# Patient Record
Sex: MALE | Race: WHITE | NOT HISPANIC OR LATINO | Employment: UNEMPLOYED | URBAN - METROPOLITAN AREA
[De-identification: names, ages, dates, MRNs, and addresses within clinical notes are randomized per-mention and may not be internally consistent; named-entity substitution may affect disease eponyms.]

---

## 2023-06-26 LAB
FERRITIN (UG/LL) IN SER/PLAS: 82 UG/L (ref 20–300)
IRON (UG/DL) IN SER/PLAS: 81 UG/DL (ref 35–150)
IRON BINDING CAPACITY (UG/DL) IN SER/PLAS: 392 UG/DL (ref 240–445)
IRON SATURATION (%) IN SER/PLAS: 21 % (ref 25–45)

## 2023-08-16 ENCOUNTER — HOSPITAL ENCOUNTER (OUTPATIENT)
Dept: DATA CONVERSION | Facility: HOSPITAL | Age: 73
Discharge: HOME | End: 2023-08-16
Payer: MEDICARE

## 2023-08-16 DIAGNOSIS — R06.2 WHEEZING: ICD-10-CM

## 2023-08-16 DIAGNOSIS — R07.89 OTHER CHEST PAIN: ICD-10-CM

## 2023-09-18 PROBLEM — G47.33 OSA (OBSTRUCTIVE SLEEP APNEA): Status: ACTIVE | Noted: 2023-09-18

## 2023-09-18 PROBLEM — I48.91 ATRIAL FIBRILLATION (MULTI): Status: ACTIVE | Noted: 2023-09-18

## 2023-09-18 PROBLEM — E78.5 HYPERLIPIDEMIA: Status: ACTIVE | Noted: 2023-09-18

## 2023-09-18 PROBLEM — R94.31 ELECTROCARDIOGRAM ABNORMAL: Status: ACTIVE | Noted: 2023-09-18

## 2023-09-18 PROBLEM — G47.30 SLEEP APNEA: Status: ACTIVE | Noted: 2023-09-18

## 2023-09-18 PROBLEM — I73.9 CLAUDICATION (CMS-HCC): Status: ACTIVE | Noted: 2023-09-18

## 2023-09-18 PROBLEM — R00.2 PALPITATIONS: Status: ACTIVE | Noted: 2023-09-18

## 2023-09-18 RX ORDER — ATORVASTATIN CALCIUM 20 MG/1
1 TABLET, FILM COATED ORAL DAILY
COMMUNITY
Start: 2022-11-08

## 2023-09-18 RX ORDER — METOPROLOL TARTRATE 25 MG/1
1 TABLET, FILM COATED ORAL 3 TIMES DAILY
COMMUNITY
Start: 2020-08-12 | End: 2024-03-08 | Stop reason: SDUPTHER

## 2023-09-18 RX ORDER — PHOSPHATIDYL SERINE 100 MG
1 CAPSULE ORAL DAILY
COMMUNITY

## 2023-09-22 ENCOUNTER — HOSPITAL ENCOUNTER (OUTPATIENT)
Dept: DATA CONVERSION | Facility: HOSPITAL | Age: 73
Discharge: HOME | End: 2023-09-22
Payer: MEDICARE

## 2023-09-22 DIAGNOSIS — I25.10 ATHEROSCLEROTIC HEART DISEASE OF NATIVE CORONARY ARTERY WITHOUT ANGINA PECTORIS: ICD-10-CM

## 2023-09-22 LAB
APPEARANCE PLAS: ABNORMAL
CHOLEST SERPL-MCNC: 123 MG/DL (ref 133–200)
CHOLEST/HDLC SERPL: 3.3 RATIO
COLOR SPUN FLD: YELLOW
FASTING STATUS PATIENT QL REPORTED: ABNORMAL
HDLC SERPL-MCNC: 37 MG/DL
LDLC SERPL CALC-MCNC: 63 MG/DL (ref 65–130)
TRIGL SERPL-MCNC: 113 MG/DL (ref 40–150)

## 2023-10-23 ENCOUNTER — PHARMACY VISIT (OUTPATIENT)
Dept: PHARMACY | Facility: CLINIC | Age: 73
End: 2023-10-23
Payer: MEDICARE

## 2023-10-23 PROCEDURE — RXMED WILLOW AMBULATORY MEDICATION CHARGE

## 2023-11-07 NOTE — PROGRESS NOTES
Assessment/Plan   Patient's low back pain with radiation to the right lower extremity is most consistent with lumbar stenosis caused by multilevel disc bulging and degenerative changes and grade 1 L5 degenerative spondylolisthesis as evident on his most recent lumbar spine MRI. He is mostly neurologically intact. Treatment will involve soft tissue and spinal manipulation as well as dry needling for the lumbar erectors and right gluteus medius. Also advised the patient to do flexion-based stretches multiple times per day including lying supine and hugging his knee or knees to his chest and also ride his exercise bike or go in the pool for exercise but only to exercise to tolerance and not push through any lower extremity pain. We can refer him for pain management or obtain updated imaging if needed pending a trial of care. Advised he may continue receiving spinal traction at an outside clinic since it has been helpful in the past. Requesting previous imaging from [facility] be sent digitally.      Visits this year: 19    Subjective   Patient reports exacerbation of right-sided stabbing localized low back pain over the past few days without specific injury.  Pain was 7 out of 10 upon waking this morning and of subsided slightly after taking an NSAID.  Has slight tingling in the lateral thighs.  Overall he has been doing much better over the past couple of months and has not had any recent sciatic type symptoms and is able to walk for longer periods for example when standing and walking or going grocery shopping.  Has been stretching somewhat on his own.  Has not been extremely active but still doing some activity such as walking.    HPI - LBP w/ radiation (R) 06/14/2023 - Patient reports chronic low back pain with radiation to the right gluteal region and leg. Notes that he has had low back pain since around 2010 or 2000. Symptoms came on insidiously. Since around 2017 or 2018 he has developed radiating pain into the right  lower extremity. Has tried a few different chiropractors for treatment. Initially went to a chiropractor that is helping him a lot however the chiropractor unfortunately passed away and that he saw a new chiropractor retired. Recently saw a different chiropractor and is getting some relief with mechanical traction therapy. Notes that he occasionally will get weakness in the right lower extremity. Also dealing with bilateral knee pain and has been advised that he needs right total knee replacement. Symptoms are worse in the lower back and lower extremity with standing walking or after being in the car for a long drive and typically alleviated by sitting in a recliner. Also dealing with atrial fibrillation. In general has been less active since the back pain is worsened over the past 5 years and also because of the atrial fibrillation. Would like to be more active and be able to do more gardening and walking as currently can only walk for a few minutes depending on how severe symptoms are before pain will flare.     Review of Systems   Constitutional:  Negative for fever.   Eyes:  Negative for visual disturbance.   Respiratory:  Negative for shortness of breath.    Cardiovascular:  Negative for chest pain.   Gastrointestinal:  Negative for diarrhea, nausea and vomiting.   Genitourinary:  Negative for difficulty urinating and dysuria.   Skin:  Negative for color change.   Neurological:  Negative for dizziness.   Psychiatric/Behavioral:  Negative for agitation.    All other systems reviewed and are negative.      Objective   Examination findings (e.g., palpation & ROM): Decreased L/S ROM with pain, hypertonic and tender lumbar erectors   BL SLR 60  Relief in knee-chest position    Segmental joint dysfunction was identified in the following areas using motion palpation and/or pain provocation assessment:  Cervical:   Thoracic: 5-8, 12  Lumbopelvic:  1,2, BL SIJ    Physical Exam    Plan   Today's treatment:  SMT to  regions of segmental dysfunction identified on exam, using age-appropriate force, and manual diversified technique.   STM to patient tolerance to hypertonic paraspinal muscles  Manual dynamic stretching of the gluteal muscles, hamstrings, upper trapezius  Patient noted reduced pain and improved mobility post-treatment with pain 3/10 NRS    Treatment Plan:   The patient and I discussed the risks and benefits of chiropractic care. Based on the patient's subjective complaints along with the examination findings, it is advised that a course of chiropractic treatment by initiated. Consent for care was given both written and orally by the patient. The patient tolerated today's treatment with little or no additional discomfort and was instructed to contact the office for questions or concerns. Will see patient once per week then every 2 weeks when symptoms become mild/manageable, further spaced apart contingent upon improvement.     This chart note was generated using dictation software, and as such, there may be typographical errors present. Abbreviations: Cervical spine (CS), Dry needling (DN), Flexion adduction internal rotation (FAIR), high velocity, low amplitude (HVLA), Lumbar spine (LS), Soft tissue manipulation (STM), spinal manipulative therapy (SMT), Straight leg raise (SLR), Thoracic spine (TS).

## 2023-11-08 ENCOUNTER — ALLIED HEALTH (OUTPATIENT)
Dept: INTEGRATIVE MEDICINE | Facility: CLINIC | Age: 73
End: 2023-11-08
Payer: MEDICARE

## 2023-11-08 DIAGNOSIS — M99.03 SOMATIC DYSFUNCTION OF LUMBAR REGION: ICD-10-CM

## 2023-11-08 DIAGNOSIS — M99.05 SOMATIC DYSFUNCTION OF PELVIS REGION: ICD-10-CM

## 2023-11-08 DIAGNOSIS — M99.02 SOMATIC DYSFUNCTION OF THORACIC REGION: ICD-10-CM

## 2023-11-08 DIAGNOSIS — M48.062 LUMBAR STENOSIS WITH NEUROGENIC CLAUDICATION: Primary | ICD-10-CM

## 2023-11-08 PROCEDURE — 98941 CHIROPRACT MANJ 3-4 REGIONS: CPT | Performed by: CHIROPRACTOR

## 2023-11-08 ASSESSMENT — ENCOUNTER SYMPTOMS
SHORTNESS OF BREATH: 0
FEVER: 0
AGITATION: 0
VOMITING: 0
NAUSEA: 0
DIFFICULTY URINATING: 0
DYSURIA: 0
DIZZINESS: 0
COLOR CHANGE: 0
DIARRHEA: 0

## 2023-11-14 ASSESSMENT — ENCOUNTER SYMPTOMS
NAUSEA: 0
SHORTNESS OF BREATH: 0
DIARRHEA: 0
VOMITING: 0
COLOR CHANGE: 0
DIFFICULTY URINATING: 0
DYSURIA: 0
FEVER: 0
AGITATION: 0
DIZZINESS: 0

## 2023-11-14 NOTE — PROGRESS NOTES
Assessment/Plan   Patient's low back pain with radiation to the right lower extremity is most consistent with lumbar stenosis caused by multilevel disc bulging and degenerative changes and grade 1 L5 degenerative spondylolisthesis as evident on his most recent lumbar spine MRI. He is mostly neurologically intact. Treatment will involve soft tissue and spinal manipulation as well as dry needling for the lumbar erectors and right gluteus medius. Also advised the patient to do flexion-based stretches multiple times per day including lying supine and hugging his knee or knees to his chest and also ride his exercise bike or go in the pool for exercise but only to exercise to tolerance and not push through any lower extremity pain. We can refer him for pain management or obtain updated imaging if needed pending a trial of care. Advised he may continue receiving spinal traction at an outside clinic since it has been helpful in the past. Requesting previous imaging from [facility] be sent digitally.      Visits this year: 21    Subjective   Patient reports overall much improvement since his last visit noting pain has been mild and intermittent and localized to the lower back.  He had 1 episode of sharp pain in the right lateral thigh however it was brief and subsided on its own.  Has been somewhat active doing yard work and mowing his lawn and also has been stretching on a semiregular basis on his own which gives him some relief from the lower back pain.    HPI - LBP w/ radiation (R) 06/14/2023 - Patient reports chronic low back pain with radiation to the right gluteal region and leg. Notes that he has had low back pain since around 2010 or 2000. Symptoms came on insidiously. Since around 2017 or 2018 he has developed radiating pain into the right lower extremity. Has tried a few different chiropractors for treatment. Initially went to a chiropractor that is helping him a lot however the chiropractor unfortunately passed away and  that he saw a new chiropractor retired. Recently saw a different chiropractor and is getting some relief with mechanical traction therapy. Notes that he occasionally will get weakness in the right lower extremity. Also dealing with bilateral knee pain and has been advised that he needs right total knee replacement. Symptoms are worse in the lower back and lower extremity with standing walking or after being in the car for a long drive and typically alleviated by sitting in a recliner. Also dealing with atrial fibrillation. In general has been less active since the back pain is worsened over the past 5 years and also because of the atrial fibrillation. Would like to be more active and be able to do more gardening and walking as currently can only walk for a few minutes depending on how severe symptoms are before pain will flare.     Review of Systems   Constitutional:  Negative for fever.   Eyes:  Negative for visual disturbance.   Respiratory:  Negative for shortness of breath.    Cardiovascular:  Negative for chest pain.   Gastrointestinal:  Negative for diarrhea, nausea and vomiting.   Genitourinary:  Negative for difficulty urinating and dysuria.   Skin:  Negative for color change.   Neurological:  Negative for dizziness.   Psychiatric/Behavioral:  Negative for agitation.    All other systems reviewed and are negative.    Objective   Examination findings (e.g., palpation & ROM): Decreased L/S ROM with pain, hypertonic and tender lumbar erectors & QL (R)  BL SLR 60  Relief in knee-chest position    Segmental joint dysfunction was identified in the following areas using motion palpation and/or pain provocation assessment:  Cervical:   Thoracic: 5-8, 12  Lumbopelvic:  1,2, BL SIJ    Physical Exam    Plan   Today's treatment:  SMT to regions of segmental dysfunction identified on exam, using age-appropriate force, and manual diversified technique.   STM to patient tolerance to hypertonic paraspinal muscles in  LS  Manual dynamic stretching of the gluteal muscles, hamstrings  Patient noted reduced pain and improved mobility post-treatment    Treatment Plan:   The patient and I discussed the risks and benefits of chiropractic care. Based on the patient's subjective complaints along with the examination findings, it is advised that a course of chiropractic treatment by initiated. Consent for care was given both written and orally by the patient. The patient tolerated today's treatment with little or no additional discomfort and was instructed to contact the office for questions or concerns. Will see patient once per week then every 2 weeks when symptoms become mild/manageable, further spaced apart contingent upon improvement.     This chart note was generated using dictation software, and as such, there may be typographical errors present. Abbreviations: Cervical spine (CS), Dry needling (DN), Flexion adduction internal rotation (FAIR), high velocity, low amplitude (HVLA), Lumbar spine (LS), Soft tissue manipulation (STM), spinal manipulative therapy (SMT), Straight leg raise (SLR), Thoracic spine (TS).

## 2023-11-15 ENCOUNTER — ALLIED HEALTH (OUTPATIENT)
Dept: INTEGRATIVE MEDICINE | Facility: CLINIC | Age: 73
End: 2023-11-15
Payer: MEDICARE

## 2023-11-15 DIAGNOSIS — M48.062 LUMBAR STENOSIS WITH NEUROGENIC CLAUDICATION: ICD-10-CM

## 2023-11-15 DIAGNOSIS — M99.02 SOMATIC DYSFUNCTION OF THORACIC REGION: Primary | ICD-10-CM

## 2023-11-15 DIAGNOSIS — M99.05 SOMATIC DYSFUNCTION OF PELVIS REGION: ICD-10-CM

## 2023-11-15 DIAGNOSIS — M99.03 SOMATIC DYSFUNCTION OF LUMBAR REGION: ICD-10-CM

## 2023-11-15 PROCEDURE — 98941 CHIROPRACT MANJ 3-4 REGIONS: CPT | Performed by: CHIROPRACTOR

## 2023-12-12 ASSESSMENT — ENCOUNTER SYMPTOMS
FEVER: 0
VOMITING: 0
AGITATION: 0
DIFFICULTY URINATING: 0
DIARRHEA: 0
COLOR CHANGE: 0
DIZZINESS: 0
DYSURIA: 0
SHORTNESS OF BREATH: 0
NAUSEA: 0

## 2023-12-12 NOTE — PROGRESS NOTES
Assessment/Plan   Patient's low back pain with radiation to the right lower extremity is most consistent with lumbar stenosis caused by multilevel disc bulging and degenerative changes and grade 1 L5 degenerative spondylolisthesis as evident on his most recent lumbar spine MRI. He is mostly neurologically intact. Treatment will involve soft tissue and spinal manipulation as well as dry needling for the lumbar erectors and right gluteus medius. Also advised the patient to do flexion-based stretches multiple times per day including lying supine and hugging his knee or knees to his chest and also ride his exercise bike or go in the pool for exercise but only to exercise to tolerance and not push through any lower extremity pain. We can refer him for pain management or obtain updated imaging if needed pending a trial of care. Advised he may continue receiving spinal traction at an outside clinic since it has been helpful in the past. Requesting previous imaging from [facility] be sent digitally.      Visits this year: 22    Subjective   Patient reports that LBP is mild/intermittent however he has not had any sciatic pain/leg pain since last visit which has been good. Has been somewhat active with yardwork. No neck pain. Felt good after last visit.    HPI - LBP w/ radiation (R) 06/14/2023 - Patient reports chronic low back pain with radiation to the right gluteal region and leg. Notes that he has had low back pain since around 2010 or 2000. Symptoms came on insidiously. Since around 2017 or 2018 he has developed radiating pain into the right lower extremity. Has tried a few different chiropractors for treatment. Initially went to a chiropractor that is helping him a lot however the chiropractor unfortunately passed away and that he saw a new chiropractor retired. Recently saw a different chiropractor and is getting some relief with mechanical traction therapy. Notes that he occasionally will get weakness in the right lower  extremity. Also dealing with bilateral knee pain and has been advised that he needs right total knee replacement. Symptoms are worse in the lower back and lower extremity with standing walking or after being in the car for a long drive and typically alleviated by sitting in a recliner. Also dealing with atrial fibrillation. In general has been less active since the back pain is worsened over the past 5 years and also because of the atrial fibrillation. Would like to be more active and be able to do more gardening and walking as currently can only walk for a few minutes depending on how severe symptoms are before pain will flare.     Review of Systems   Constitutional:  Negative for fever.   Eyes:  Negative for visual disturbance.   Respiratory:  Negative for shortness of breath.    Cardiovascular:  Negative for chest pain.   Gastrointestinal:  Negative for diarrhea, nausea and vomiting.   Genitourinary:  Negative for difficulty urinating and dysuria.   Skin:  Negative for color change.   Neurological:  Negative for dizziness.   Psychiatric/Behavioral:  Negative for agitation.    All other systems reviewed and are negative.    Objective   Examination findings (e.g., palpation & ROM): Decreased L/S ROM with pain, hypertonic and tender lumbar erectors & QL (L>R)  BL SLR 60, tightness on R  Relief in knee-chest position    Segmental joint dysfunction was identified in the following areas using motion palpation and/or pain provocation assessment:  Cervical:   Thoracic: 5-8, 12  Lumbopelvic:  1,2, BL SIJ    Physical Exam    Plan   Today's treatment:  SMT to regions of segmental dysfunction identified on exam, using age-appropriate force, and manual diversified technique.   STM to patient tolerance to hypertonic paraspinal muscles in LS  Manual dynamic stretching of the gluteal muscles, hamstrings 5m  Patient noted reduced pain and improved mobility post-treatment    Treatment Plan:   The patient and I discussed the risks  and benefits of chiropractic care. Based on the patient's subjective complaints along with the examination findings, it is advised that a course of chiropractic treatment by initiated. Consent for care was given both written and orally by the patient. The patient tolerated today's treatment with little or no additional discomfort and was instructed to contact the office for questions or concerns. Will see patient once per week then every 2 weeks when symptoms become mild/manageable, further spaced apart contingent upon improvement.     This chart note was generated using dictation software, and as such, there may be typographical errors present. Abbreviations: Cervical spine (CS), Dry needling (DN), Flexion adduction internal rotation (FAIR), high velocity, low amplitude (HVLA), Lumbar spine (LS), Soft tissue manipulation (STM), spinal manipulative therapy (SMT), Straight leg raise (SLR), Thoracic spine (TS).

## 2023-12-13 ENCOUNTER — ALLIED HEALTH (OUTPATIENT)
Dept: INTEGRATIVE MEDICINE | Facility: CLINIC | Age: 73
End: 2023-12-13
Payer: MEDICARE

## 2023-12-13 DIAGNOSIS — M99.05 SOMATIC DYSFUNCTION OF PELVIS REGION: ICD-10-CM

## 2023-12-13 DIAGNOSIS — M99.03 SOMATIC DYSFUNCTION OF LUMBAR REGION: ICD-10-CM

## 2023-12-13 DIAGNOSIS — M48.062 LUMBAR STENOSIS WITH NEUROGENIC CLAUDICATION: ICD-10-CM

## 2023-12-13 DIAGNOSIS — M99.02 SOMATIC DYSFUNCTION OF THORACIC REGION: Primary | ICD-10-CM

## 2023-12-13 PROCEDURE — 98941 CHIROPRACT MANJ 3-4 REGIONS: CPT | Performed by: CHIROPRACTOR

## 2023-12-18 ENCOUNTER — OFFICE VISIT (OUTPATIENT)
Dept: SLEEP MEDICINE | Facility: CLINIC | Age: 73
End: 2023-12-18
Payer: MEDICARE

## 2023-12-18 VITALS
WEIGHT: 200 LBS | HEIGHT: 71 IN | DIASTOLIC BLOOD PRESSURE: 70 MMHG | BODY MASS INDEX: 28 KG/M2 | SYSTOLIC BLOOD PRESSURE: 134 MMHG | OXYGEN SATURATION: 98 % | HEART RATE: 67 BPM

## 2023-12-18 DIAGNOSIS — G47.33 OBSTRUCTIVE SLEEP APNEA (ADULT) (PEDIATRIC): Primary | ICD-10-CM

## 2023-12-18 PROCEDURE — 1160F RVW MEDS BY RX/DR IN RCRD: CPT | Performed by: INTERNAL MEDICINE

## 2023-12-18 PROCEDURE — 1159F MED LIST DOCD IN RCRD: CPT | Performed by: INTERNAL MEDICINE

## 2023-12-18 PROCEDURE — 1036F TOBACCO NON-USER: CPT | Performed by: INTERNAL MEDICINE

## 2023-12-18 PROCEDURE — 1126F AMNT PAIN NOTED NONE PRSNT: CPT | Performed by: INTERNAL MEDICINE

## 2023-12-18 PROCEDURE — 99213 OFFICE O/P EST LOW 20 MIN: CPT | Performed by: INTERNAL MEDICINE

## 2023-12-18 ASSESSMENT — SLEEP AND FATIGUE QUESTIONNAIRES
HOW LIKELY ARE YOU TO NOD OFF OR FALL ASLEEP WHILE SITTING AND TALKING TO SOMEONE: WOULD NEVER DOZE
SITING INACTIVE IN A PUBLIC PLACE LIKE A CLASS ROOM OR A MOVIE THEATER: WOULD NEVER DOZE
ESS-CHAD TOTAL SCORE: 4
HOW LIKELY ARE YOU TO NOD OFF OR FALL ASLEEP IN A CAR, WHILE STOPPED FOR A FEW MINUTES IN TRAFFIC: WOULD NEVER DOZE
HOW LIKELY ARE YOU TO NOD OFF OR FALL ASLEEP WHILE WATCHING TV: MODERATE CHANCE OF DOZING
HOW LIKELY ARE YOU TO NOD OFF OR FALL ASLEEP WHILE SITTING AND READING: WOULD NEVER DOZE
HOW LIKELY ARE YOU TO NOD OFF OR FALL ASLEEP WHEN YOU ARE A PASSENGER IN A CAR FOR AN HOUR WITHOUT A BREAK: WOULD NEVER DOZE
HOW LIKELY ARE YOU TO NOD OFF OR FALL ASLEEP WHILE LYING DOWN TO REST IN THE AFTERNOON WHEN CIRCUMSTANCES PERMIT: MODERATE CHANCE OF DOZING
HOW LIKELY ARE YOU TO NOD OFF OR FALL ASLEEP WHILE SITTING QUIETLY AFTER LUNCH WITHOUT ALCOHOL: WOULD NEVER DOZE

## 2023-12-18 ASSESSMENT — PAIN SCALES - GENERAL: PAINLEVEL: 0-NO PAIN

## 2023-12-18 NOTE — ASSESSMENT & PLAN NOTE
- Artie Grove  has sleep apnea and requires treatment.  - Artie Grove demonstrates good compliance and benefit from PAP therapy  - Continue Auto PAP cmH20 through [facility]  - Order for renewal of PAP supplies placed   - Discussed recall and info on the Dreamstation 2

## 2023-12-18 NOTE — PROGRESS NOTES
"    Subjective   Patient ID: Artie Grove is a 73 y.o. male who presents for Pap Adherence Followup and Sleep Apnea.  HPI    Prior Sleep History:  05/15/2023; office Visit: Dr. Phillip Henson: ARTIE is here to follow-up on the management of his obstructive sleep apnea which is currently being managed with positive airway pressure therapy.   A downloaded compliance report was reviewed and was interpreted by myself as follows: > 4 hour compliance was 100%, with an average use of 7 hours and 21 minutes, with a residual AHI 7.3 with periodic breathing 7.2%.   ARTIE reports good benefit from his device.   His afib is worse and he is going to follow-up with EP.   06/26/2023: Office Visit: Dr. Phillip Henson: ARTIE is here to discuss the results of his sleep study. ARTIE had a Titration study 6/5/23, CPAP 9 cmH2O, PLM-I 91.6  09/25/2023 : Office Visit: Dr. Phillip Henson: ARTIE is here to follow-up on the management of his obstructive sleep apnea which is currently being managed with positive airway pressure therapy.   A downloaded compliance report was reviewed and was interpreted by myself as follows: > 4 hour compliance was 100%, with an average use of 7 hours and 30 minutes, with a residual AHI 6.1.   PROBABLE RESTLESS LEG SYNDROME: This occurs frequently.  - Last ferritin 82, %iron saturation 21% on the lower side  He uses a dreamwear nasal mask.   Change to Auto- PAP 9-15 cmH20 through [facility]     Current Sleep History:      A downloaded compliance report was reviewed and was interpreted by myself as follows:  > 4 hour compliance was 100%, with an average use of 7 hours and 23 minutes, with a residual AHI 5.8 on AutoPap 9 to 15 cm H2O.     Artie Grove reports good benefit from his device.  Responded well to change in pressure    ESS: 4     Review of Systems  Review of systems negative except as per HPI  Objective   /70   Pulse 67   Ht 1.803 m (5' 11\")   Wt 90.7 kg (200 lb)   SpO2 " 98%   BMI 27.89 kg/m²    Physical Exam  PHYSICAL EXAM: GENERAL: alert pleasant and cooperative no acute distress  PSYCH EXAM: alert,oriented, in NAD with a full range of affect, normal behavior and no psychotic features    Lab Results   Component Value Date    IRON 81 06/26/2023    TIBC 392 06/26/2023    FERRITIN 82 06/26/2023        Assessment/Plan   Problem List Items Addressed This Visit             ICD-10-CM    Obstructive sleep apnea (adult) (pediatric) - Primary G47.33     - Artie Grove  has sleep apnea and requires treatment.  - Artie Grove demonstrates good compliance and benefit from PAP therapy  - Continue Auto PAP cmH20 through [facility]  - Order for renewal of PAP supplies placed   - Discussed recall and info on the Dreamstation 2

## 2023-12-18 NOTE — PATIENT INSTRUCTIONS
Carefully Monitor [employer] DreamStation 2 CPAP Machines for Signs of Overheating: FDA Safety Communication     Share   Post  Linkedin  Email  Print     Date Issued: November 28, 2023    The U.S. Food and Drug Administration (FDA) is warning patients and health care providers to carefully monitor [employer] DreamStation 2 continuous positive airway pressure (CPAP) machines for signs of overheating.    The FDA recently received medical device reports (MDRs) associated with thermal issues such as fire, smoke, burns, and other signs of overheating while people are using [employer] DreamStation 2 CPAP machines. The agency observed a recent increase in reports about these thermal issues with DreamStation 2 CPAP machines. Between August 1, 2023, and November 15, 2023, the FDA received more than 270 reports of problems associated with the device, compared with fewer than 30 MDRs received in the previous three years.    Consumers should be aware some DreamStation 2 CPAP machines were distributed as replacements for recalled DreamStation 1 CPAP machines. Based on the currently available evidence, the agency does not believe the safety issue with the DreamStation 2 is related to the foam used in the machine. This is a developing situation, and to date, reports gathered and analyzed by the FDA indicate that the thermal issues reported for the DreamStation 2 CPAP machines may be related to an electrical or a mechanical malfunction of the machine, which may cause it to overheat in certain situations.    Recommendations for Patients, Caregivers, and Health Care Providers  Follow the ’s instructions in the user manualExternal Link Disclaimer, including:  place the CPAP machine on a firm, flat surface  keep the CPAP away from carpet, fabric, or other flammable materials  carefully clean the CPAP machine  empty the CPAP machine’s water reservoir  let the CPAP machine’s heater plate and water tank cool for approximately 15  minutes before removing the tank to reduce the risk of burns. You could be burned if you touch the heater plate, come in contact with the heated water, or touch the humidifier water tank pan.  Inspect and examine the CPAP machine before and after each use for unusual smells or changes in its appearance. Some problems may only be noticeable when the machine is running, so pay attention to any differences in the CPAP machine as you prepare for bed.  Unplug the CPAP machine and do not use it if:  you smell burning, smoke, or any unusual odors,  there is a change in the appearance of the CPAP machine,  there are unexplained changes to the CPAP machine’s performance,  water is spilled into the CPAP machine,  you hear unusual sounds coming from the CPAP machine.  If you are unable to use your CPAP device due to these issues, discuss your individual health situation with a health care provider, such as your primary care physician or sleep doctor.    At this time, if you are not experiencing these issues, then the FDA does not recommend discontinuing use of these machines.  Report any concerns about the CPAP machine to the FDA, including unusual smells, sounds, or changes in appearance, and report any concerns to [employer] Link Disclaimer.  Device Description  The [employer] DreamStation 2 CPAP machine delivers positive airway pressure for treatment of obstructive sleep apnea. It is used at home and in clinical settings.    FDA Actions  The FDA is working with the company to better understand the issue(s) related to the DreamStation 2 CPAP machine and the possible underlying cause(s) and will update the public with new information, as appropriate.     The FDA is also in ongoing discussions with the company about mitigation strategies for this safety issue and will update the public accordingly.    Reporting Problems with Your Device  If you think there may be a problem with your CPAP machine, the FDA encourages you to  report the problem through the [employer] Voluntary Reporting Form.    Health care personnel employed by facilities that are subject to the FDA's user facility reporting requirements should follow the reporting procedures established by their facilities.    Questions?  If you have questions, email the Division of Industry and Consumer Education (DICE) at [email or web address] or call [phone] or [phone].    Additional Resources  FDA Press Release

## 2023-12-27 ENCOUNTER — ALLIED HEALTH (OUTPATIENT)
Dept: INTEGRATIVE MEDICINE | Facility: CLINIC | Age: 73
End: 2023-12-27
Payer: MEDICARE

## 2023-12-27 DIAGNOSIS — M99.02 SOMATIC DYSFUNCTION OF THORACIC REGION: Primary | ICD-10-CM

## 2023-12-27 DIAGNOSIS — M48.062 LUMBAR STENOSIS WITH NEUROGENIC CLAUDICATION: ICD-10-CM

## 2023-12-27 DIAGNOSIS — M99.05 SOMATIC DYSFUNCTION OF PELVIS REGION: ICD-10-CM

## 2023-12-27 DIAGNOSIS — M99.03 SOMATIC DYSFUNCTION OF LUMBAR REGION: ICD-10-CM

## 2023-12-27 PROCEDURE — 98941 CHIROPRACT MANJ 3-4 REGIONS: CPT | Performed by: CHIROPRACTOR

## 2023-12-27 ASSESSMENT — ENCOUNTER SYMPTOMS
VOMITING: 0
AGITATION: 0
DIFFICULTY URINATING: 0
DIARRHEA: 0
FEVER: 0
DIZZINESS: 0
DYSURIA: 0
COLOR CHANGE: 0
NAUSEA: 0
SHORTNESS OF BREATH: 0

## 2023-12-27 NOTE — PROGRESS NOTES
Assessment/Plan   Patient's low back pain with radiation to the right lower extremity is most consistent with lumbar stenosis caused by multilevel disc bulging and degenerative changes and grade 1 L5 degenerative spondylolisthesis as evident on his most recent lumbar spine MRI. He is mostly neurologically intact. Treatment will involve soft tissue and spinal manipulation as well as dry needling for the lumbar erectors and right gluteus medius. Also advised the patient to do flexion-based stretches multiple times per day including lying supine and hugging his knee or knees to his chest and also ride his exercise bike or go in the pool for exercise but only to exercise to tolerance and not push through any lower extremity pain. We can refer him for pain management or obtain updated imaging if needed pending a trial of care. Advised he may continue receiving spinal traction at an outside clinic since it has been helpful in the past. Requesting previous imaging from [facility] be sent digitally.      Visits this year: 23    Subjective   Patient reports that he has had localized left-sided low back pain which has been intermittent and moderate in intensity as well as tightness extending up into the upper back and bilateral periscapular region.  Has not been as active as he would like.  Has noticed that sometimes his heart rate will go to high.  Does have a cardiologist that he can follow-up with but he tracks his heart rate regularly and is careful because of his A-fib.  Is interested in increasing his exercise level but wants to take it slowly and has been guided to do so in the past considering his heart condition.  Has been careful with his dietary habits    HPI - LBP w/ radiation (R) 06/14/2023 - Patient reports chronic low back pain with radiation to the right gluteal region and leg. Notes that he has had low back pain since around 2010 or 2000. Symptoms came on insidiously. Since around 2017 or 2018 he has developed  radiating pain into the right lower extremity. Has tried a few different chiropractors for treatment. Initially went to a chiropractor that is helping him a lot however the chiropractor unfortunately passed away and that he saw a new chiropractor retired. Recently saw a different chiropractor and is getting some relief with mechanical traction therapy. Notes that he occasionally will get weakness in the right lower extremity. Also dealing with bilateral knee pain and has been advised that he needs right total knee replacement. Symptoms are worse in the lower back and lower extremity with standing walking or after being in the car for a long drive and typically alleviated by sitting in a recliner. Also dealing with atrial fibrillation. In general has been less active since the back pain is worsened over the past 5 years and also because of the atrial fibrillation. Would like to be more active and be able to do more gardening and walking as currently can only walk for a few minutes depending on how severe symptoms are before pain will flare.     Review of Systems   Constitutional:  Negative for fever.   Eyes:  Negative for visual disturbance.   Respiratory:  Negative for shortness of breath.    Cardiovascular:  Negative for chest pain.   Gastrointestinal:  Negative for diarrhea, nausea and vomiting.   Genitourinary:  Negative for difficulty urinating and dysuria.   Skin:  Negative for color change.   Neurological:  Negative for dizziness.   Psychiatric/Behavioral:  Negative for agitation.    All other systems reviewed and are negative.    Objective   Examination findings (e.g., palpation & ROM): Decreased L/S ROM with pain, hypertonic and tender lumbar erectors & QL (L>R)  BL SLR 65, tightness on R  Relief in knee-chest position    Segmental joint dysfunction was identified in the following areas using motion palpation and/or pain provocation assessment:  Cervical:   Thoracic: 5-8, 12  Lumbopelvic:  1,2, BL  SIJ    Physical Exam    Plan   Today's treatment:  SMT to regions of segmental dysfunction identified on exam, using age-appropriate force, and manual diversified technique.   STM to patient tolerance to hypertonic paraspinal muscles in LS  Manual dynamic stretching of the gluteal muscles, hamstrings, periscapular mm 5m  Patient noted reduced pain and improved mobility post-treatment    Treatment Plan:   The patient and I discussed the risks and benefits of chiropractic care. Based on the patient's subjective complaints along with the examination findings, it is advised that a course of chiropractic treatment by initiated. Consent for care was given both written and orally by the patient. The patient tolerated today's treatment with little or no additional discomfort and was instructed to contact the office for questions or concerns. Will see patient once per week then every 2 weeks when symptoms become mild/manageable, further spaced apart contingent upon improvement.     This chart note was generated using dictation software, and as such, there may be typographical errors present. Abbreviations: Cervical spine (CS), Dry needling (DN), Flexion adduction internal rotation (FAIR), high velocity, low amplitude (HVLA), Lumbar spine (LS), Soft tissue manipulation (STM), spinal manipulative therapy (SMT), Straight leg raise (SLR), Thoracic spine (TS).

## 2024-01-03 ENCOUNTER — APPOINTMENT (OUTPATIENT)
Dept: INTEGRATIVE MEDICINE | Facility: CLINIC | Age: 74
End: 2024-01-03
Payer: MEDICARE

## 2024-01-16 ASSESSMENT — ENCOUNTER SYMPTOMS
DIFFICULTY URINATING: 0
DIZZINESS: 0
DIARRHEA: 0
FEVER: 0
SHORTNESS OF BREATH: 0
COLOR CHANGE: 0
AGITATION: 0
DYSURIA: 0
VOMITING: 0
NAUSEA: 0

## 2024-01-16 NOTE — PROGRESS NOTES
Assessment/Plan   Patient's low back pain with radiation to the right lower extremity is most consistent with lumbar stenosis caused by multilevel disc bulging and degenerative changes and grade 1 L5 degenerative spondylolisthesis as evident on his most recent lumbar spine MRI. He is mostly neurologically intact. Treatment will involve soft tissue and spinal manipulation as well as dry needling for the lumbar erectors and right gluteus medius. Also advised the patient to do flexion-based stretches multiple times per day including lying supine and hugging his knee or knees to his chest and also ride his exercise bike or go in the pool for exercise but only to exercise to tolerance and not push through any lower extremity pain. We can refer him for pain management or obtain updated imaging if needed pending a trial of care. Advised he may continue receiving spinal traction at an outside clinic since it has been helpful in the past. Requesting previous imaging from [facility] be sent digitally.      Visits this year: 1    Subjective   Patient reports that his lower back has been slightly aggravated over the past few days he is not exactly sure why.  Was carrying some boxes but was not anything extremely physical.  Thinks that maybe he was doing more activity and exercise because he was feeling better and this in turn side and back.  Otherwise no specific injury or trauma.  Some radiating pain into the right anterior thigh which she describes as a burning type sensation which is intermittent.  Pain is described as mild to moderate in intensity and frequent.  Symptoms are alleviated by sitting. Also notes mild neck stiffness/tightness.    HPI - LBP w/ radiation (R) 06/14/2023 - Patient reports chronic low back pain with radiation to the right gluteal region and leg. Notes that he has had low back pain since around 2010 or 2000. Symptoms came on insidiously. Since around 2017 or 2018 he has developed radiating pain into the  right lower extremity. Has tried a few different chiropractors for treatment. Initially went to a chiropractor that is helping him a lot however the chiropractor unfortunately passed away and that he saw a new chiropractor retired. Recently saw a different chiropractor and is getting some relief with mechanical traction therapy. Notes that he occasionally will get weakness in the right lower extremity. Also dealing with bilateral knee pain and has been advised that he needs right total knee replacement. Symptoms are worse in the lower back and lower extremity with standing walking or after being in the car for a long drive and typically alleviated by sitting in a recliner. Also dealing with atrial fibrillation. In general has been less active since the back pain is worsened over the past 5 years and also because of the atrial fibrillation. Would like to be more active and be able to do more gardening and walking as currently can only walk for a few minutes depending on how severe symptoms are before pain will flare.     Review of Systems   Constitutional:  Negative for fever.   Eyes:  Negative for visual disturbance.   Respiratory:  Negative for shortness of breath.    Cardiovascular:  Negative for chest pain.   Gastrointestinal:  Negative for diarrhea, nausea and vomiting.   Genitourinary:  Negative for difficulty urinating and dysuria.   Skin:  Negative for color change.   Neurological:  Negative for dizziness.   Psychiatric/Behavioral:  Negative for agitation.    All other systems reviewed and are negative.    Objective   Examination findings (e.g., palpation & ROM): Decreased L/S ROM with pain, hypertonic and tender lumbar erectors & QL (L>R)  BL SLR 65, tightness on R  Relief in knee-chest position    Segmental joint dysfunction was identified in the following areas using motion palpation and/or pain provocation assessment:  Cervical: 7  Thoracic: 5-8, 12  Lumbopelvic:  1,2, BL SIJ    Physical Exam    Plan    Today's treatment:  SMT to regions of segmental dysfunction identified on exam, using age-appropriate force, and manual diversified technique. Mobilization used on CS  STM to patient tolerance to hypertonic paraspinal muscles in LS  Manual dynamic stretching of the gluteal muscles, hamstrings, periscapular mm 5m  Patient noted reduced pain and improved mobility post-treatment    Treatment Plan:   The patient and I discussed the risks and benefits of chiropractic care. Based on the patient's subjective complaints along with the examination findings, it is advised that a course of chiropractic treatment by initiated. Consent for care was given both written and orally by the patient. The patient tolerated today's treatment with little or no additional discomfort and was instructed to contact the office for questions or concerns. Will see patient once per week then every 2 weeks when symptoms become mild/manageable, further spaced apart contingent upon improvement.     This chart note was generated using dictation software, and as such, there may be typographical errors present. Abbreviations: Cervical spine (CS), Dry needling (DN), Flexion adduction internal rotation (FAIR), high velocity, low amplitude (HVLA), Lumbar spine (LS), Soft tissue manipulation (STM), spinal manipulative therapy (SMT), Straight leg raise (SLR), Thoracic spine (TS).

## 2024-01-17 ENCOUNTER — ALLIED HEALTH (OUTPATIENT)
Dept: INTEGRATIVE MEDICINE | Facility: CLINIC | Age: 74
End: 2024-01-17
Payer: MEDICARE

## 2024-01-17 ENCOUNTER — APPOINTMENT (OUTPATIENT)
Dept: INTEGRATIVE MEDICINE | Facility: CLINIC | Age: 74
End: 2024-01-17
Payer: MEDICARE

## 2024-01-17 DIAGNOSIS — M99.05 SOMATIC DYSFUNCTION OF PELVIS REGION: ICD-10-CM

## 2024-01-17 DIAGNOSIS — M99.03 SOMATIC DYSFUNCTION OF LUMBAR REGION: ICD-10-CM

## 2024-01-17 DIAGNOSIS — M48.062 LUMBAR STENOSIS WITH NEUROGENIC CLAUDICATION: ICD-10-CM

## 2024-01-17 DIAGNOSIS — M99.02 SOMATIC DYSFUNCTION OF THORACIC REGION: Primary | ICD-10-CM

## 2024-01-17 PROCEDURE — 98941 CHIROPRACT MANJ 3-4 REGIONS: CPT | Performed by: CHIROPRACTOR

## 2024-01-22 ENCOUNTER — PHARMACY VISIT (OUTPATIENT)
Dept: PHARMACY | Facility: CLINIC | Age: 74
End: 2024-01-22
Payer: COMMERCIAL

## 2024-01-22 PROCEDURE — RXMED WILLOW AMBULATORY MEDICATION CHARGE

## 2024-01-31 ENCOUNTER — OFFICE VISIT (OUTPATIENT)
Dept: CARDIOLOGY | Facility: CLINIC | Age: 74
End: 2024-01-31
Payer: MEDICARE

## 2024-01-31 VITALS
SYSTOLIC BLOOD PRESSURE: 130 MMHG | WEIGHT: 207 LBS | OXYGEN SATURATION: 97 % | BODY MASS INDEX: 28.87 KG/M2 | HEART RATE: 78 BPM | DIASTOLIC BLOOD PRESSURE: 68 MMHG

## 2024-01-31 DIAGNOSIS — I25.10 ASHD (ARTERIOSCLEROTIC HEART DISEASE): ICD-10-CM

## 2024-01-31 DIAGNOSIS — R00.2 PALPITATIONS: ICD-10-CM

## 2024-01-31 DIAGNOSIS — I48.91 ATRIAL FIBRILLATION, UNSPECIFIED TYPE (MULTI): ICD-10-CM

## 2024-01-31 DIAGNOSIS — I48.0 PAROXYSMAL ATRIAL FIBRILLATION (MULTI): Primary | ICD-10-CM

## 2024-01-31 PROCEDURE — 93010 ELECTROCARDIOGRAM REPORT: CPT | Performed by: INTERNAL MEDICINE

## 2024-01-31 PROCEDURE — 99214 OFFICE O/P EST MOD 30 MIN: CPT | Mod: 25 | Performed by: INTERNAL MEDICINE

## 2024-01-31 PROCEDURE — 1126F AMNT PAIN NOTED NONE PRSNT: CPT | Performed by: INTERNAL MEDICINE

## 2024-01-31 PROCEDURE — 1159F MED LIST DOCD IN RCRD: CPT | Performed by: INTERNAL MEDICINE

## 2024-01-31 PROCEDURE — 1036F TOBACCO NON-USER: CPT | Performed by: INTERNAL MEDICINE

## 2024-01-31 PROCEDURE — 93005 ELECTROCARDIOGRAM TRACING: CPT | Performed by: INTERNAL MEDICINE

## 2024-01-31 PROCEDURE — 99214 OFFICE O/P EST MOD 30 MIN: CPT | Performed by: INTERNAL MEDICINE

## 2024-01-31 ASSESSMENT — ENCOUNTER SYMPTOMS
DYSPNEA ON EXERTION: 0
SYNCOPE: 0
ORTHOPNEA: 0
DIAPHORESIS: 0
WEAKNESS: 0
WEIGHT LOSS: 0
SHORTNESS OF BREATH: 0
CLAUDICATION: 0
PALPITATIONS: 1
NEAR-SYNCOPE: 0
MYALGIAS: 0
WHEEZING: 0
COUGH: 0
FEVER: 0
PND: 0
WEIGHT GAIN: 0
DIZZINESS: 0
IRREGULAR HEARTBEAT: 0

## 2024-01-31 ASSESSMENT — PAIN SCALES - GENERAL: PAINLEVEL: 0-NO PAIN

## 2024-01-31 NOTE — PROGRESS NOTES
Subjective      Chief Complaint   Patient presents with    Possible Afib        73-year-old male with a history of paroxysmal atrial fibrillation on as needed rate control. He was not on oral anticoagulation given his [id number] of 1. He was seen in February, I had recommended again statin therapy due to an intermediate ASCVD risk he declined. He was seen in the interim by Dr Schwarz, referred to him by his PCP for palpitations. The had discussed AADs vs catheter based therapy. He had a CACS that came back >500. He had a lexiscan SPECT ST that was normal or low risk. We started Eliquis for stroke risk reduction. I saw him last in September, he was doing well.     Last night he started to notice a rapid irregular heartbeat. He has no chest pain or dyspnea. He monitors his pulse ox at home which is ok. His HR is in the 80s at times, is 150 on ECG.       Review of Systems   Constitutional: Negative for diaphoresis, fever, weight gain and weight loss.   Eyes:  Negative for visual disturbance.   Cardiovascular:  Positive for palpitations. Negative for chest pain, claudication, dyspnea on exertion, irregular heartbeat, leg swelling, near-syncope, orthopnea, paroxysmal nocturnal dyspnea and syncope.   Respiratory:  Negative for cough, shortness of breath and wheezing.    Musculoskeletal:  Negative for muscle weakness and myalgias.   Neurological:  Negative for dizziness and weakness.   All other systems reviewed and are negative.       Past Medical History:   Diagnosis Date    Atrial fibrillation (CMS/HCC)     Personal history of other diseases of the musculoskeletal system and connective tissue     History of low back pain        Past Surgical History:   Procedure Laterality Date    TONSILLECTOMY  08/12/2014    Tonsillectomy        Social History     Socioeconomic History    Marital status: Unknown     Spouse name: Not on file    Number of children: Not on file    Years of education: Not on file    Highest education  level: Not on file   Occupational History    Not on file   Tobacco Use    Smoking status: Never    Smokeless tobacco: Never   Substance and Sexual Activity    Alcohol use: Never    Drug use: Never    Sexual activity: Not on file   Other Topics Concern    Not on file   Social History Narrative    ** Merged History Encounter **          Social Determinants of Health     Financial Resource Strain: Not on file   Food Insecurity: Not on file   Transportation Needs: Not on file   Physical Activity: Not on file   Stress: Not on file   Social Connections: Not on file   Intimate Partner Violence: Not on file   Housing Stability: Not on file        Family History   Problem Relation Name Age of Onset    Heart disease Mother      Cancer Mother      Heart disease Father          OBJECTIVE:    Vitals:    01/31/24 1502   BP: 130/68   Pulse: 78   SpO2: 97%        Physical Exam     Lab Review:   Lab Results   Component Value Date     06/16/2021    K 4.6 06/16/2021     06/16/2021    CO2 20 (L) 06/16/2021    BUN 22 06/16/2021    CREATININE 1.1 06/16/2021    GLUCOSE 106 (H) 06/16/2021    CALCIUM 9.4 06/16/2021     Lab Results   Component Value Date    CHOL 123 (L) 09/22/2023    TRIG 113 09/22/2023    HDL 37 (L) 09/22/2023       Lab Results   Component Value Date    LDLCALC 63 (L) 09/22/2023        No problem-specific Assessment & Plan notes found for this encounter.

## 2024-01-31 NOTE — ASSESSMENT & PLAN NOTE
Presumed based on CACS>500. Lexiscan ST 8/2023 was normal or low risk. Continue atorvastatin. Goal LDL <70

## 2024-01-31 NOTE — ASSESSMENT & PLAN NOTE
With rvr. Will have him increase Metoprolol to 1 tab TID for now. Will check an echocardiogram. Continue Eliquis. Refer back to Dr Schwarz to discuss AAD vs catheter based options

## 2024-02-05 ENCOUNTER — HOSPITAL ENCOUNTER (OUTPATIENT)
Dept: CARDIOLOGY | Facility: HOSPITAL | Age: 74
Discharge: HOME | End: 2024-02-05
Payer: MEDICARE

## 2024-02-05 DIAGNOSIS — I48.0 PAROXYSMAL ATRIAL FIBRILLATION (MULTI): ICD-10-CM

## 2024-02-05 LAB
AORTIC VALVE MEAN GRADIENT: 2 MMHG
AORTIC VALVE PEAK VELOCITY: 1.08 M/S
AV PEAK GRADIENT: 4.7 MMHG
AVA (PEAK VEL): 2.84 CM2
AVA (VTI): 3.06 CM2
EJECTION FRACTION APICAL 4 CHAMBER: 58.8
EJECTION FRACTION: 61 %
LEFT ATRIUM VOLUME AREA LENGTH INDEX BSA: 23.9 ML/M2
LEFT VENTRICLE INTERNAL DIMENSION DIASTOLE: 4.41 CM (ref 3.5–6)
LEFT VENTRICULAR OUTFLOW TRACT DIAMETER: 1.9 CM
MITRAL VALVE E/A RATIO: 0.91
MITRAL VALVE E/E' RATIO: 9.46
RIGHT VENTRICLE FREE WALL PEAK S': 15.8 CM/S
RIGHT VENTRICLE PEAK SYSTOLIC PRESSURE: 22.7 MMHG
TRICUSPID ANNULAR PLANE SYSTOLIC EXCURSION: 2.8 CM

## 2024-02-05 PROCEDURE — 93306 TTE W/DOPPLER COMPLETE: CPT | Performed by: INTERNAL MEDICINE

## 2024-02-05 PROCEDURE — 93306 TTE W/DOPPLER COMPLETE: CPT

## 2024-03-07 ENCOUNTER — OFFICE VISIT (OUTPATIENT)
Dept: CARDIOLOGY | Facility: CLINIC | Age: 74
End: 2024-03-07
Payer: MEDICARE

## 2024-03-07 ENCOUNTER — TELEPHONE (OUTPATIENT)
Dept: CARDIOLOGY | Facility: CLINIC | Age: 74
End: 2024-03-07

## 2024-03-07 VITALS — BODY MASS INDEX: 28.73 KG/M2 | DIASTOLIC BLOOD PRESSURE: 70 MMHG | SYSTOLIC BLOOD PRESSURE: 132 MMHG | WEIGHT: 206 LBS

## 2024-03-07 DIAGNOSIS — I48.0 PAROXYSMAL ATRIAL FIBRILLATION (MULTI): Primary | ICD-10-CM

## 2024-03-07 PROCEDURE — 93005 ELECTROCARDIOGRAM TRACING: CPT | Performed by: INTERNAL MEDICINE

## 2024-03-07 PROCEDURE — 1159F MED LIST DOCD IN RCRD: CPT | Performed by: INTERNAL MEDICINE

## 2024-03-07 PROCEDURE — 93010 ELECTROCARDIOGRAM REPORT: CPT | Performed by: INTERNAL MEDICINE

## 2024-03-07 PROCEDURE — 1036F TOBACCO NON-USER: CPT | Performed by: INTERNAL MEDICINE

## 2024-03-07 PROCEDURE — 1126F AMNT PAIN NOTED NONE PRSNT: CPT | Performed by: INTERNAL MEDICINE

## 2024-03-07 PROCEDURE — 99214 OFFICE O/P EST MOD 30 MIN: CPT | Performed by: INTERNAL MEDICINE

## 2024-03-07 ASSESSMENT — ENCOUNTER SYMPTOMS
DEPRESSION: 0
OCCASIONAL FEELINGS OF UNSTEADINESS: 0
LOSS OF SENSATION IN FEET: 0

## 2024-03-07 ASSESSMENT — PAIN SCALES - GENERAL: PAINLEVEL: 0-NO PAIN

## 2024-03-07 ASSESSMENT — COLUMBIA-SUICIDE SEVERITY RATING SCALE - C-SSRS
2. HAVE YOU ACTUALLY HAD ANY THOUGHTS OF KILLING YOURSELF?: NO
6. HAVE YOU EVER DONE ANYTHING, STARTED TO DO ANYTHING, OR PREPARED TO DO ANYTHING TO END YOUR LIFE?: NO

## 2024-03-07 ASSESSMENT — PATIENT HEALTH QUESTIONNAIRE - PHQ9
SUM OF ALL RESPONSES TO PHQ9 QUESTIONS 1 AND 2: 0
1. LITTLE INTEREST OR PLEASURE IN DOING THINGS: NOT AT ALL
2. FEELING DOWN, DEPRESSED OR HOPELESS: NOT AT ALL

## 2024-03-07 NOTE — PROGRESS NOTES
No chief complaint on file.           The patient is known to me from previous encounters.  He is a 73-year-old male with a history of paroxysmal atrial fibrillation longest episode lasting about a half a day.  This in the setting of hypertension, borderline diabetes and likely coronary disease with an elevated coronary calcium score.  He has no significant structural heart disease.  His longest episode occurred while he was caring for his wife who just underwent knee surgery and he had quite a bit of stress and lack of sleep leading towards the episode.  He remains quite reluctant to pursue antiarrhythmic drug therapy as well as catheter-based therapy.         Active Ambulatory Problems     Diagnosis Date Noted    Atrial fibrillation (CMS/[facility]) 09/18/2023    Claudication (CMS/[facility]) 09/18/2023    Electrocardiogram abnormal 09/18/2023    Hyperlipidemia 09/18/2023    Obstructive sleep apnea (adult) (pediatric) 09/18/2023    Palpitations 09/18/2023    ASHD (arteriosclerotic heart disease) 01/31/2024     Resolved Ambulatory Problems     Diagnosis Date Noted    No Resolved Ambulatory Problems     Past Medical History:   Diagnosis Date    Personal history of other diseases of the musculoskeletal system and connective tissue         Review of Systems   All other systems reviewed and are negative.       Objective     There were no vitals filed for this visit.     Vitals and nursing note reviewed.   Constitutional:       Appearance: Healthy appearance.   HENT:    Mouth/Throat:      Pharynx: Oropharynx is clear.   Pulmonary:      Effort: Pulmonary effort is normal.      Breath sounds: Normal breath sounds.   Cardiovascular:      PMI at left midclavicular line. Normal rate. Regular rhythm. Normal S1. Normal S2.       Murmurs: There is a grade 1/6 holosystolic murmur.      No gallop.  No click. No rub.   Pulses:     Intact distal pulses.   Edema:     Peripheral edema absent.   Abdominal:      General: Bowel sounds are normal.    Musculoskeletal:      Cervical back: Normal range of motion. Skin:     General: Skin is warm and dry.   Neurological:      General: No focal deficit present.      Mental Status: Alert and oriented to person, place and time.            Lab Review:   Hospital Outpatient Visit on 02/05/2024   Component Date Value    AV pk yusuf 02/05/2024 1.08     LVOT diam 02/05/2024 1.90     AV mn grad 02/05/2024 2.0     MV E/A ratio 02/05/2024 0.91     Tricuspid annular plane * 02/05/2024 2.8     LV biplane EF 02/05/2024 61     LA vol index A/L 02/05/2024 23.9     MV avg E/e' ratio 02/05/2024 9.46     RV free wall pk S' 02/05/2024 15.80     RVSP 02/05/2024 22.7     LVIDd 02/05/2024 4.41     AV pk grad 02/05/2024 4.7     Aortic Valve Area by Con* 02/05/2024 3.06     Aortic Valve Area by Con* 02/05/2024 2.84     LV A4C EF 02/05/2024 58.8        ECG:  Normal sinus rhythm at 62 bpm NJ interval 160 ms QRS duration 80 ms QTc 380 ms    Assessment/plan:  I have given the patient literature regarding catheter-based therapy.  He still has very paroxysmal episodes and I think continuation of beta-blocker and anticoagulation are reasonable this juncture.  He will consider catheter-based therapy in the future if he has further episodes.    Problem List Items Addressed This Visit    None

## 2024-03-07 NOTE — ASSESSMENT & PLAN NOTE
I have given the patient literature regarding catheter-based therapy.  He still has very paroxysmal episodes and I think continuation of beta-blocker and anticoagulation are reasonable this juncture.  He will consider catheter-based therapy in the future if he has further episodes.

## 2024-03-07 NOTE — TELEPHONE ENCOUNTER
Patient stopped in the office today for a refill for Metoprolol Tartrate 25mg 1 tab three times daily.  Patient states his dosage was changed from 1/2 tab three times daily to 1 tab three times daily sent to [facility]..  He is almost out, thanks

## 2024-03-08 DIAGNOSIS — I48.91 ATRIAL FIBRILLATION, UNSPECIFIED TYPE (MULTI): Primary | ICD-10-CM

## 2024-03-08 RX ORDER — METOPROLOL TARTRATE 25 MG/1
25 TABLET, FILM COATED ORAL 3 TIMES DAILY
Qty: 270 TABLET | Refills: 3 | Status: SHIPPED | OUTPATIENT
Start: 2024-03-08

## 2024-03-20 ENCOUNTER — OFFICE VISIT (OUTPATIENT)
Dept: CARDIOLOGY | Facility: CLINIC | Age: 74
End: 2024-03-20
Payer: MEDICARE

## 2024-03-20 VITALS
WEIGHT: 209 LBS | SYSTOLIC BLOOD PRESSURE: 138 MMHG | BODY MASS INDEX: 29.15 KG/M2 | HEART RATE: 63 BPM | OXYGEN SATURATION: 97 % | DIASTOLIC BLOOD PRESSURE: 78 MMHG

## 2024-03-20 DIAGNOSIS — I25.10 ASHD (ARTERIOSCLEROTIC HEART DISEASE): Primary | ICD-10-CM

## 2024-03-20 DIAGNOSIS — I48.0 PAROXYSMAL ATRIAL FIBRILLATION (MULTI): ICD-10-CM

## 2024-03-20 PROCEDURE — 1126F AMNT PAIN NOTED NONE PRSNT: CPT | Performed by: INTERNAL MEDICINE

## 2024-03-20 PROCEDURE — 99214 OFFICE O/P EST MOD 30 MIN: CPT | Performed by: INTERNAL MEDICINE

## 2024-03-20 PROCEDURE — 1159F MED LIST DOCD IN RCRD: CPT | Performed by: INTERNAL MEDICINE

## 2024-03-20 PROCEDURE — 1036F TOBACCO NON-USER: CPT | Performed by: INTERNAL MEDICINE

## 2024-03-20 ASSESSMENT — ENCOUNTER SYMPTOMS
WHEEZING: 0
MYALGIAS: 0
PALPITATIONS: 0
CLAUDICATION: 0
WEAKNESS: 0
OCCASIONAL FEELINGS OF UNSTEADINESS: 0
ORTHOPNEA: 0
NEAR-SYNCOPE: 0
DIAPHORESIS: 0
DYSPNEA ON EXERTION: 0
WEIGHT GAIN: 0
DIZZINESS: 0
WEIGHT LOSS: 0
IRREGULAR HEARTBEAT: 0
COUGH: 0
DEPRESSION: 0
SHORTNESS OF BREATH: 0
PND: 0
LOSS OF SENSATION IN FEET: 0
FEVER: 0
SYNCOPE: 0

## 2024-03-20 ASSESSMENT — PATIENT HEALTH QUESTIONNAIRE - PHQ9
1. LITTLE INTEREST OR PLEASURE IN DOING THINGS: NOT AT ALL
1. LITTLE INTEREST OR PLEASURE IN DOING THINGS: NOT AT ALL
SUM OF ALL RESPONSES TO PHQ9 QUESTIONS 1 AND 2: 0
SUM OF ALL RESPONSES TO PHQ9 QUESTIONS 1 & 2: 0
2. FEELING DOWN, DEPRESSED OR HOPELESS: NOT AT ALL
2. FEELING DOWN, DEPRESSED OR HOPELESS: NOT AT ALL

## 2024-03-20 ASSESSMENT — PAIN SCALES - GENERAL: PAINLEVEL: 0-NO PAIN

## 2024-03-20 NOTE — PROGRESS NOTES
Subjective      Chief Complaint   Patient presents with    Follow-up        73-year-old male with a history of paroxysmal atrial fibrillation on as needed rate control. He was not on oral anticoagulation given his [id number] of 1. He was seen in February, I had recommended again statin therapy due to an intermediate ASCVD risk he declined. He was seen in the interim by Dr Schwarz, referred to him by his PCP for palpitations. The had discussed AADs vs catheter based therapy. He had a CACS that came back >500. He had a lexiscan SPECT ST that was normal or low risk. We started Eliquis for stroke risk reduction.  When I saw him last he was still complaining of episodic palpitations, he seemed to be rather bothered by these.  We referred him to Dr. Schwarz for an evaluation, decision was made to continue with beta-blocker therapy rather than an AAD versus catheter-based therapy         Review of Systems   Constitutional: Negative for diaphoresis, fever, weight gain and weight loss.   Eyes:  Negative for visual disturbance.   Cardiovascular:  Negative for chest pain, claudication, dyspnea on exertion, irregular heartbeat, leg swelling, near-syncope, orthopnea, palpitations, paroxysmal nocturnal dyspnea and syncope.   Respiratory:  Negative for cough, shortness of breath and wheezing.    Musculoskeletal:  Negative for muscle weakness and myalgias.   Neurological:  Negative for dizziness and weakness.   All other systems reviewed and are negative.       Past Medical History:   Diagnosis Date    Atrial fibrillation (CMS/HCC)     Personal history of other diseases of the musculoskeletal system and connective tissue     History of low back pain        Past Surgical History:   Procedure Laterality Date    TONSILLECTOMY  08/12/2014    Tonsillectomy        Social History     Socioeconomic History    Marital status: Unknown     Spouse name: Not on file    Number of children: Not on file    Years of education: Not on file     Highest education level: Not on file   Occupational History    Not on file   Tobacco Use    Smoking status: Never    Smokeless tobacco: Never   Substance and Sexual Activity    Alcohol use: Never    Drug use: Never    Sexual activity: Defer   Other Topics Concern    Not on file   Social History Narrative    ** Merged History Encounter **          Social Determinants of Health     Financial Resource Strain: Not on file   Food Insecurity: Not on file   Transportation Needs: Not on file   Physical Activity: Not on file   Stress: Not on file   Social Connections: Not on file   Intimate Partner Violence: Not on file   Housing Stability: Not on file        Family History   Problem Relation Name Age of Onset    Heart disease Mother      Cancer Mother      Heart disease Father          OBJECTIVE:    Vitals:    03/20/24 1350   BP: 138/78   Pulse: 63   SpO2: 97%        Vitals reviewed.   Constitutional:       Appearance: Normal and healthy appearance. Not in distress.   Pulmonary:      Effort: Pulmonary effort is normal.      Breath sounds: Normal breath sounds.   Cardiovascular:      Normal rate. Regular rhythm. Normal S1. Normal S2.       Murmurs: There is no murmur.      No gallop.  No click.   Pulses:     Intact distal pulses.   Edema:     Peripheral edema absent.   Skin:     General: Skin is warm and dry.   Neurological:      General: No focal deficit present.          Lab Review:   Lab Results   Component Value Date     06/16/2021    K 4.6 06/16/2021     06/16/2021    CO2 20 (L) 06/16/2021    BUN 22 06/16/2021    CREATININE 1.1 06/16/2021    GLUCOSE 106 (H) 06/16/2021    CALCIUM 9.4 06/16/2021     Lab Results   Component Value Date    CHOL 123 (L) 09/22/2023    TRIG 113 09/22/2023    HDL 37 (L) 09/22/2023       Lab Results   Component Value Date    LDLCALC 63 (L) 09/22/2023        ASHD (arteriosclerotic heart disease)  Stable without angina. Presumed based on CACS. Lipids in Sept reviewed, LDL is at goal.      Atrial fibrillation (CMS/HCC)  No recent palpitations since titrating BB. Continue this and Eliquis, [id number] is 2, soon 3

## 2024-04-19 PROCEDURE — RXMED WILLOW AMBULATORY MEDICATION CHARGE

## 2024-04-23 ENCOUNTER — PHARMACY VISIT (OUTPATIENT)
Dept: PHARMACY | Facility: CLINIC | Age: 74
End: 2024-04-23
Payer: COMMERCIAL

## 2024-05-03 ENCOUNTER — HOSPITAL ENCOUNTER (EMERGENCY)
Facility: HOSPITAL | Age: 74
Discharge: HOME | End: 2024-05-03
Attending: EMERGENCY MEDICINE
Payer: MEDICARE

## 2024-05-03 ENCOUNTER — TELEPHONE (OUTPATIENT)
Dept: CARDIOLOGY | Facility: CLINIC | Age: 74
End: 2024-05-03

## 2024-05-03 VITALS
BODY MASS INDEX: 29.35 KG/M2 | RESPIRATION RATE: 15 BRPM | HEART RATE: 57 BPM | WEIGHT: 209.66 LBS | HEIGHT: 71 IN | TEMPERATURE: 97.9 F | SYSTOLIC BLOOD PRESSURE: 106 MMHG | DIASTOLIC BLOOD PRESSURE: 68 MMHG | OXYGEN SATURATION: 95 %

## 2024-05-03 DIAGNOSIS — I48.0 PAROXYSMAL ATRIAL FIBRILLATION (MULTI): Primary | ICD-10-CM

## 2024-05-03 LAB
ANION GAP SERPL CALC-SCNC: 14 MMOL/L
BASOPHILS # BLD AUTO: 0.07 X10*3/UL (ref 0–0.1)
BASOPHILS NFR BLD AUTO: 0.8 %
BUN SERPL-MCNC: 29 MG/DL (ref 8–25)
CALCIUM SERPL-MCNC: 10 MG/DL (ref 8.5–10.4)
CHLORIDE SERPL-SCNC: 103 MMOL/L (ref 97–107)
CO2 SERPL-SCNC: 20 MMOL/L (ref 24–31)
CREAT SERPL-MCNC: 1 MG/DL (ref 0.4–1.6)
EGFRCR SERPLBLD CKD-EPI 2021: 79 ML/MIN/1.73M*2
EOSINOPHIL # BLD AUTO: 0.21 X10*3/UL (ref 0–0.4)
EOSINOPHIL NFR BLD AUTO: 2.4 %
ERYTHROCYTE [DISTWIDTH] IN BLOOD BY AUTOMATED COUNT: 17.3 % (ref 11.5–14.5)
GLUCOSE SERPL-MCNC: 136 MG/DL (ref 65–99)
HCT VFR BLD AUTO: 56.4 % (ref 41–52)
HGB BLD-MCNC: 18.9 G/DL (ref 13.5–17.5)
IMM GRANULOCYTES # BLD AUTO: 0.02 X10*3/UL (ref 0–0.5)
IMM GRANULOCYTES NFR BLD AUTO: 0.2 % (ref 0–0.9)
LYMPHOCYTES # BLD AUTO: 1.55 X10*3/UL (ref 0.8–3)
LYMPHOCYTES NFR BLD AUTO: 17.9 %
MCH RBC QN AUTO: 28.5 PG (ref 26–34)
MCHC RBC AUTO-ENTMCNC: 33.5 G/DL (ref 32–36)
MCV RBC AUTO: 85 FL (ref 80–100)
MONOCYTES # BLD AUTO: 1.27 X10*3/UL (ref 0.05–0.8)
MONOCYTES NFR BLD AUTO: 14.6 %
NEUTROPHILS # BLD AUTO: 5.56 X10*3/UL (ref 1.6–5.5)
NEUTROPHILS NFR BLD AUTO: 64.1 %
NRBC BLD-RTO: 0 /100 WBCS (ref 0–0)
PLATELET # BLD AUTO: 170 X10*3/UL (ref 150–450)
POTASSIUM SERPL-SCNC: 4.3 MMOL/L (ref 3.4–5.1)
RBC # BLD AUTO: 6.63 X10*6/UL (ref 4.5–5.9)
SODIUM SERPL-SCNC: 137 MMOL/L (ref 133–145)
WBC # BLD AUTO: 8.7 X10*3/UL (ref 4.4–11.3)

## 2024-05-03 PROCEDURE — 85025 COMPLETE CBC W/AUTO DIFF WBC: CPT | Performed by: EMERGENCY MEDICINE

## 2024-05-03 PROCEDURE — 96374 THER/PROPH/DIAG INJ IV PUSH: CPT

## 2024-05-03 PROCEDURE — 80048 BASIC METABOLIC PNL TOTAL CA: CPT | Performed by: EMERGENCY MEDICINE

## 2024-05-03 PROCEDURE — 2500000005 HC RX 250 GENERAL PHARMACY W/O HCPCS: Performed by: EMERGENCY MEDICINE

## 2024-05-03 PROCEDURE — 99284 EMERGENCY DEPT VISIT MOD MDM: CPT | Mod: 25

## 2024-05-03 PROCEDURE — 93010 ELECTROCARDIOGRAM REPORT: CPT | Performed by: INTERNAL MEDICINE

## 2024-05-03 PROCEDURE — 36415 COLL VENOUS BLD VENIPUNCTURE: CPT | Performed by: EMERGENCY MEDICINE

## 2024-05-03 RX ORDER — DILTIAZEM HYDROCHLORIDE 5 MG/ML
15 INJECTION INTRAVENOUS ONCE
Status: COMPLETED | OUTPATIENT
Start: 2024-05-03 | End: 2024-05-03

## 2024-05-03 RX ADMIN — DILTIAZEM HYDROCHLORIDE 15 MG: 5 INJECTION INTRAVENOUS at 21:58

## 2024-05-03 ASSESSMENT — PAIN - FUNCTIONAL ASSESSMENT: PAIN_FUNCTIONAL_ASSESSMENT: 0-10

## 2024-05-03 ASSESSMENT — COLUMBIA-SUICIDE SEVERITY RATING SCALE - C-SSRS
6. HAVE YOU EVER DONE ANYTHING, STARTED TO DO ANYTHING, OR PREPARED TO DO ANYTHING TO END YOUR LIFE?: NO
2. HAVE YOU ACTUALLY HAD ANY THOUGHTS OF KILLING YOURSELF?: NO
1. IN THE PAST MONTH, HAVE YOU WISHED YOU WERE DEAD OR WISHED YOU COULD GO TO SLEEP AND NOT WAKE UP?: NO

## 2024-05-03 ASSESSMENT — PAIN SCALES - GENERAL: PAINLEVEL_OUTOF10: 0 - NO PAIN

## 2024-05-03 NOTE — TELEPHONE ENCOUNTER
Patient called in Afib getting much worse than before & lasting longer than before.     Metoprolol is not helping. Patient calling for medical advise.

## 2024-05-04 ENCOUNTER — APPOINTMENT (OUTPATIENT)
Dept: RADIOLOGY | Facility: HOSPITAL | Age: 74
End: 2024-05-04
Payer: MEDICARE

## 2024-05-04 ENCOUNTER — APPOINTMENT (OUTPATIENT)
Dept: CARDIOLOGY | Facility: HOSPITAL | Age: 74
End: 2024-05-04
Payer: MEDICARE

## 2024-05-04 ENCOUNTER — HOSPITAL ENCOUNTER (OUTPATIENT)
Facility: HOSPITAL | Age: 74
Setting detail: OBSERVATION
Discharge: HOME | End: 2024-05-05
Attending: EMERGENCY MEDICINE | Admitting: INTERNAL MEDICINE
Payer: MEDICARE

## 2024-05-04 DIAGNOSIS — I48.0 PAROXYSMAL ATRIAL FIBRILLATION (MULTI): ICD-10-CM

## 2024-05-04 DIAGNOSIS — I48.92 ATRIAL FLUTTER, UNSPECIFIED TYPE (MULTI): Primary | ICD-10-CM

## 2024-05-04 PROBLEM — I48.91 ATRIAL FIBRILLATION WITH RAPID VENTRICULAR RESPONSE (MULTI): Status: ACTIVE | Noted: 2024-05-04

## 2024-05-04 LAB
ALBUMIN SERPL-MCNC: 4.4 G/DL (ref 3.5–5)
ALP BLD-CCNC: 64 U/L (ref 35–125)
ALT SERPL-CCNC: 43 U/L (ref 5–40)
ANION GAP SERPL CALC-SCNC: 12 MMOL/L
AST SERPL-CCNC: 35 U/L (ref 5–40)
BASOPHILS # BLD AUTO: 0.06 X10*3/UL (ref 0–0.1)
BASOPHILS NFR BLD AUTO: 0.7 %
BILIRUB SERPL-MCNC: 0.5 MG/DL (ref 0.1–1.2)
BUN SERPL-MCNC: 30 MG/DL (ref 8–25)
CALCIUM SERPL-MCNC: 10 MG/DL (ref 8.5–10.4)
CHLORIDE SERPL-SCNC: 106 MMOL/L (ref 97–107)
CO2 SERPL-SCNC: 22 MMOL/L (ref 24–31)
CREAT SERPL-MCNC: 1 MG/DL (ref 0.4–1.6)
EGFRCR SERPLBLD CKD-EPI 2021: 79 ML/MIN/1.73M*2
EOSINOPHIL # BLD AUTO: 0.12 X10*3/UL (ref 0–0.4)
EOSINOPHIL NFR BLD AUTO: 1.5 %
ERYTHROCYTE [DISTWIDTH] IN BLOOD BY AUTOMATED COUNT: 17.7 % (ref 11.5–14.5)
GLUCOSE SERPL-MCNC: 81 MG/DL (ref 65–99)
HCT VFR BLD AUTO: 58 % (ref 41–52)
HGB BLD-MCNC: 19.3 G/DL (ref 13.5–17.5)
IMM GRANULOCYTES # BLD AUTO: 0.01 X10*3/UL (ref 0–0.5)
IMM GRANULOCYTES NFR BLD AUTO: 0.1 % (ref 0–0.9)
INR PPP: 1.1 (ref 0.9–1.2)
LYMPHOCYTES # BLD AUTO: 0.95 X10*3/UL (ref 0.8–3)
LYMPHOCYTES NFR BLD AUTO: 11.8 %
MAGNESIUM SERPL-MCNC: 2.6 MG/DL (ref 1.6–3.1)
MCH RBC QN AUTO: 28.7 PG (ref 26–34)
MCHC RBC AUTO-ENTMCNC: 33.3 G/DL (ref 32–36)
MCV RBC AUTO: 86 FL (ref 80–100)
MONOCYTES # BLD AUTO: 1.31 X10*3/UL (ref 0.05–0.8)
MONOCYTES NFR BLD AUTO: 16.2 %
NEUTROPHILS # BLD AUTO: 5.63 X10*3/UL (ref 1.6–5.5)
NEUTROPHILS NFR BLD AUTO: 69.7 %
NRBC BLD-RTO: 0 /100 WBCS (ref 0–0)
NT-PROBNP SERPL-MCNC: 1584 PG/ML (ref 0–229)
PLATELET # BLD AUTO: 163 X10*3/UL (ref 150–450)
POTASSIUM SERPL-SCNC: 5 MMOL/L (ref 3.4–5.1)
PROT SERPL-MCNC: 7.7 G/DL (ref 5.9–7.9)
PROTHROMBIN TIME: 10.9 SECONDS (ref 9.3–12.7)
RBC # BLD AUTO: 6.73 X10*6/UL (ref 4.5–5.9)
SODIUM SERPL-SCNC: 140 MMOL/L (ref 133–145)
TROPONIN T SERPL-MCNC: 12 NG/L
TROPONIN T SERPL-MCNC: 14 NG/L
TROPONIN T SERPL-MCNC: 14 NG/L
TSH SERPL DL<=0.05 MIU/L-ACNC: 2.16 MIU/L (ref 0.27–4.2)
WBC # BLD AUTO: 8.1 X10*3/UL (ref 4.4–11.3)

## 2024-05-04 PROCEDURE — G0378 HOSPITAL OBSERVATION PER HR: HCPCS

## 2024-05-04 PROCEDURE — 84484 ASSAY OF TROPONIN QUANT: CPT | Performed by: CLINICAL NURSE SPECIALIST

## 2024-05-04 PROCEDURE — 96361 HYDRATE IV INFUSION ADD-ON: CPT

## 2024-05-04 PROCEDURE — 2500000004 HC RX 250 GENERAL PHARMACY W/ HCPCS (ALT 636 FOR OP/ED): Performed by: CLINICAL NURSE SPECIALIST

## 2024-05-04 PROCEDURE — 85610 PROTHROMBIN TIME: CPT | Performed by: CLINICAL NURSE SPECIALIST

## 2024-05-04 PROCEDURE — 93005 ELECTROCARDIOGRAM TRACING: CPT

## 2024-05-04 PROCEDURE — 84443 ASSAY THYROID STIM HORMONE: CPT | Performed by: CLINICAL NURSE SPECIALIST

## 2024-05-04 PROCEDURE — 2500000001 HC RX 250 WO HCPCS SELF ADMINISTERED DRUGS (ALT 637 FOR MEDICARE OP): Performed by: NURSE PRACTITIONER

## 2024-05-04 PROCEDURE — 99291 CRITICAL CARE FIRST HOUR: CPT | Performed by: CLINICAL NURSE SPECIALIST

## 2024-05-04 PROCEDURE — 36415 COLL VENOUS BLD VENIPUNCTURE: CPT | Performed by: CLINICAL NURSE SPECIALIST

## 2024-05-04 PROCEDURE — 2500000005 HC RX 250 GENERAL PHARMACY W/O HCPCS: Performed by: CLINICAL NURSE SPECIALIST

## 2024-05-04 PROCEDURE — 96374 THER/PROPH/DIAG INJ IV PUSH: CPT

## 2024-05-04 PROCEDURE — 2500000006 HC RX 250 W HCPCS SELF ADMINISTERED DRUGS (ALT 637 FOR ALL PAYERS): Performed by: EMERGENCY MEDICINE

## 2024-05-04 PROCEDURE — 71045 X-RAY EXAM CHEST 1 VIEW: CPT

## 2024-05-04 PROCEDURE — 2500000004 HC RX 250 GENERAL PHARMACY W/ HCPCS (ALT 636 FOR OP/ED): Performed by: EMERGENCY MEDICINE

## 2024-05-04 PROCEDURE — 71045 X-RAY EXAM CHEST 1 VIEW: CPT | Performed by: RADIOLOGY

## 2024-05-04 PROCEDURE — 96375 TX/PRO/DX INJ NEW DRUG ADDON: CPT

## 2024-05-04 PROCEDURE — 93010 ELECTROCARDIOGRAM REPORT: CPT | Performed by: INTERNAL MEDICINE

## 2024-05-04 PROCEDURE — 80053 COMPREHEN METABOLIC PANEL: CPT | Performed by: CLINICAL NURSE SPECIALIST

## 2024-05-04 PROCEDURE — 83880 ASSAY OF NATRIURETIC PEPTIDE: CPT | Performed by: CLINICAL NURSE SPECIALIST

## 2024-05-04 PROCEDURE — 83735 ASSAY OF MAGNESIUM: CPT | Performed by: CLINICAL NURSE SPECIALIST

## 2024-05-04 PROCEDURE — 85025 COMPLETE CBC W/AUTO DIFF WBC: CPT | Performed by: CLINICAL NURSE SPECIALIST

## 2024-05-04 RX ORDER — METOPROLOL SUCCINATE 25 MG/1
50 TABLET, EXTENDED RELEASE ORAL 2 TIMES DAILY
Status: DISCONTINUED | OUTPATIENT
Start: 2024-05-04 | End: 2024-05-04

## 2024-05-04 RX ORDER — DIGOXIN 0.25 MG/ML
125 INJECTION INTRAMUSCULAR; INTRAVENOUS DAILY
Status: DISCONTINUED | OUTPATIENT
Start: 2024-05-05 | End: 2024-05-05

## 2024-05-04 RX ORDER — ONDANSETRON HYDROCHLORIDE 2 MG/ML
4 INJECTION, SOLUTION INTRAVENOUS EVERY 8 HOURS PRN
Status: DISCONTINUED | OUTPATIENT
Start: 2024-05-04 | End: 2024-05-05 | Stop reason: HOSPADM

## 2024-05-04 RX ORDER — ACETAMINOPHEN 160 MG/5ML
650 SOLUTION ORAL EVERY 4 HOURS PRN
Status: DISCONTINUED | OUTPATIENT
Start: 2024-05-04 | End: 2024-05-05 | Stop reason: HOSPADM

## 2024-05-04 RX ORDER — ACETAMINOPHEN 650 MG/1
650 SUPPOSITORY RECTAL EVERY 4 HOURS PRN
Status: DISCONTINUED | OUTPATIENT
Start: 2024-05-04 | End: 2024-05-05 | Stop reason: HOSPADM

## 2024-05-04 RX ORDER — ACETAMINOPHEN 325 MG/1
650 TABLET ORAL EVERY 4 HOURS PRN
Status: DISCONTINUED | OUTPATIENT
Start: 2024-05-04 | End: 2024-05-05 | Stop reason: HOSPADM

## 2024-05-04 RX ORDER — DILTIAZEM HYDROCHLORIDE 5 MG/ML
10 INJECTION INTRAVENOUS ONCE
Status: COMPLETED | OUTPATIENT
Start: 2024-05-04 | End: 2024-05-04

## 2024-05-04 RX ORDER — METOPROLOL SUCCINATE 50 MG/1
50 TABLET, EXTENDED RELEASE ORAL 2 TIMES DAILY
Status: DISCONTINUED | OUTPATIENT
Start: 2024-05-05 | End: 2024-05-05 | Stop reason: HOSPADM

## 2024-05-04 RX ORDER — DIGOXIN 0.25 MG/ML
250 INJECTION INTRAMUSCULAR; INTRAVENOUS ONCE
Status: DISCONTINUED | OUTPATIENT
Start: 2024-05-05 | End: 2024-05-05

## 2024-05-04 RX ORDER — ONDANSETRON 4 MG/1
4 TABLET, ORALLY DISINTEGRATING ORAL EVERY 8 HOURS PRN
Status: DISCONTINUED | OUTPATIENT
Start: 2024-05-04 | End: 2024-05-05 | Stop reason: HOSPADM

## 2024-05-04 RX ORDER — ADHESIVE BANDAGE
30 BANDAGE TOPICAL DAILY PRN
Status: DISCONTINUED | OUTPATIENT
Start: 2024-05-04 | End: 2024-05-05 | Stop reason: HOSPADM

## 2024-05-04 RX ORDER — AMIODARONE HYDROCHLORIDE 200 MG/1
200 TABLET ORAL ONCE
Status: COMPLETED | OUTPATIENT
Start: 2024-05-04 | End: 2024-05-04

## 2024-05-04 RX ORDER — ATORVASTATIN CALCIUM 20 MG/1
20 TABLET, FILM COATED ORAL DAILY
Status: DISCONTINUED | OUTPATIENT
Start: 2024-05-05 | End: 2024-05-05 | Stop reason: HOSPADM

## 2024-05-04 RX ORDER — DIGOXIN 0.25 MG/ML
500 INJECTION INTRAMUSCULAR; INTRAVENOUS ONCE
Status: COMPLETED | OUTPATIENT
Start: 2024-05-04 | End: 2024-05-04

## 2024-05-04 RX ORDER — DILTIAZEM HCL/D5W 125 MG/125
5-15 PLASTIC BAG, INJECTION (ML) INTRAVENOUS CONTINUOUS
Status: DISCONTINUED | OUTPATIENT
Start: 2024-05-04 | End: 2024-05-04

## 2024-05-04 RX ADMIN — AMIODARONE HYDROCHLORIDE 200 MG: 200 TABLET ORAL at 19:49

## 2024-05-04 RX ADMIN — DILTIAZEM HYDROCHLORIDE 10 MG: 5 INJECTION INTRAVENOUS at 17:53

## 2024-05-04 RX ADMIN — DIGOXIN 500 MCG: 0.25 INJECTION INTRAMUSCULAR; INTRAVENOUS at 19:49

## 2024-05-04 RX ADMIN — SODIUM CHLORIDE 1000 ML: 900 INJECTION, SOLUTION INTRAVENOUS at 18:00

## 2024-05-04 RX ADMIN — APIXABAN 5 MG: 5 TABLET, FILM COATED ORAL at 22:03

## 2024-05-04 SDOH — ECONOMIC STABILITY: TRANSPORTATION INSECURITY
IN THE PAST 12 MONTHS, HAS THE LACK OF TRANSPORTATION KEPT YOU FROM MEDICAL APPOINTMENTS OR FROM GETTING MEDICATIONS?: NO

## 2024-05-04 SDOH — SOCIAL STABILITY: SOCIAL INSECURITY: ARE THERE ANY APPARENT SIGNS OF INJURIES/BEHAVIORS THAT COULD BE RELATED TO ABUSE/NEGLECT?: NO

## 2024-05-04 SDOH — SOCIAL STABILITY: SOCIAL INSECURITY: DO YOU FEEL UNSAFE GOING BACK TO THE PLACE WHERE YOU ARE LIVING?: NO

## 2024-05-04 SDOH — ECONOMIC STABILITY: HOUSING INSECURITY
IN THE LAST 12 MONTHS, WAS THERE A TIME WHEN YOU DID NOT HAVE A STEADY PLACE TO SLEEP OR SLEPT IN A SHELTER (INCLUDING NOW)?: NO

## 2024-05-04 SDOH — SOCIAL STABILITY: SOCIAL INSECURITY: HAS ANYONE EVER THREATENED TO HURT YOUR FAMILY OR YOUR PETS?: NO

## 2024-05-04 SDOH — SOCIAL STABILITY: SOCIAL INSECURITY: ARE YOU OR HAVE YOU BEEN THREATENED OR ABUSED PHYSICALLY, EMOTIONALLY, OR SEXUALLY BY ANYONE?: NO

## 2024-05-04 SDOH — ECONOMIC STABILITY: INCOME INSECURITY: HOW HARD IS IT FOR YOU TO PAY FOR THE VERY BASICS LIKE FOOD, HOUSING, MEDICAL CARE, AND HEATING?: NOT VERY HARD

## 2024-05-04 SDOH — ECONOMIC STABILITY: HOUSING INSECURITY: IN THE LAST 12 MONTHS, HOW MANY PLACES HAVE YOU LIVED?: 1

## 2024-05-04 SDOH — SOCIAL STABILITY: SOCIAL INSECURITY: ABUSE: ADULT

## 2024-05-04 SDOH — ECONOMIC STABILITY: INCOME INSECURITY: IN THE LAST 12 MONTHS, WAS THERE A TIME WHEN YOU WERE NOT ABLE TO PAY THE MORTGAGE OR RENT ON TIME?: NO

## 2024-05-04 SDOH — ECONOMIC STABILITY: TRANSPORTATION INSECURITY
IN THE PAST 12 MONTHS, HAS LACK OF TRANSPORTATION KEPT YOU FROM MEETINGS, WORK, OR FROM GETTING THINGS NEEDED FOR DAILY LIVING?: NO

## 2024-05-04 SDOH — SOCIAL STABILITY: SOCIAL INSECURITY: WERE YOU ABLE TO COMPLETE ALL THE BEHAVIORAL HEALTH SCREENINGS?: YES

## 2024-05-04 SDOH — SOCIAL STABILITY: SOCIAL INSECURITY: DO YOU FEEL ANYONE HAS EXPLOITED OR TAKEN ADVANTAGE OF YOU FINANCIALLY OR OF YOUR PERSONAL PROPERTY?: NO

## 2024-05-04 SDOH — SOCIAL STABILITY: SOCIAL INSECURITY: HAVE YOU HAD THOUGHTS OF HARMING ANYONE ELSE?: NO

## 2024-05-04 SDOH — SOCIAL STABILITY: SOCIAL INSECURITY: DOES ANYONE TRY TO KEEP YOU FROM HAVING/CONTACTING OTHER FRIENDS OR DOING THINGS OUTSIDE YOUR HOME?: NO

## 2024-05-04 SDOH — SOCIAL STABILITY: SOCIAL INSECURITY: HAVE YOU HAD ANY THOUGHTS OF HARMING ANYONE ELSE?: NO

## 2024-05-04 ASSESSMENT — COGNITIVE AND FUNCTIONAL STATUS - GENERAL
PATIENT BASELINE BEDBOUND: NO
PATIENT BASELINE BEDBOUND: NO
MOBILITY SCORE: 23
DAILY ACTIVITIY SCORE: 24
CLIMB 3 TO 5 STEPS WITH RAILING: A LITTLE

## 2024-05-04 ASSESSMENT — LIFESTYLE VARIABLES
HOW OFTEN DO YOU HAVE 6 OR MORE DRINKS ON ONE OCCASION: NEVER
AUDIT-C TOTAL SCORE: 0
AUDIT-C TOTAL SCORE: 0
SKIP TO QUESTIONS 9-10: 1
HOW MANY STANDARD DRINKS CONTAINING ALCOHOL DO YOU HAVE ON A TYPICAL DAY: PATIENT DOES NOT DRINK
HOW OFTEN DO YOU HAVE A DRINK CONTAINING ALCOHOL: NEVER

## 2024-05-04 ASSESSMENT — ACTIVITIES OF DAILY LIVING (ADL)
HEARING - LEFT EAR: FUNCTIONAL
TOILETING: INDEPENDENT
FEEDING YOURSELF: INDEPENDENT
BATHING: INDEPENDENT
GROOMING: INDEPENDENT
WALKS IN HOME: INDEPENDENT
ADEQUATE_TO_COMPLETE_ADL: YES
PATIENT'S MEMORY ADEQUATE TO SAFELY COMPLETE DAILY ACTIVITIES?: YES
JUDGMENT_ADEQUATE_SAFELY_COMPLETE_DAILY_ACTIVITIES: YES
HEARING - RIGHT EAR: FUNCTIONAL
DRESSING YOURSELF: INDEPENDENT

## 2024-05-04 ASSESSMENT — PAIN SCALES - GENERAL
PAINLEVEL_OUTOF10: 0 - NO PAIN
PAINLEVEL_OUTOF10: 0 - NO PAIN

## 2024-05-04 ASSESSMENT — PATIENT HEALTH QUESTIONNAIRE - PHQ9
1. LITTLE INTEREST OR PLEASURE IN DOING THINGS: NOT AT ALL
2. FEELING DOWN, DEPRESSED OR HOPELESS: NOT AT ALL
SUM OF ALL RESPONSES TO PHQ9 QUESTIONS 1 & 2: 0

## 2024-05-04 ASSESSMENT — PAIN - FUNCTIONAL ASSESSMENT
PAIN_FUNCTIONAL_ASSESSMENT: 0-10
PAIN_FUNCTIONAL_ASSESSMENT: 0-10

## 2024-05-04 ASSESSMENT — COLUMBIA-SUICIDE SEVERITY RATING SCALE - C-SSRS
6. HAVE YOU EVER DONE ANYTHING, STARTED TO DO ANYTHING, OR PREPARED TO DO ANYTHING TO END YOUR LIFE?: NO
1. IN THE PAST MONTH, HAVE YOU WISHED YOU WERE DEAD OR WISHED YOU COULD GO TO SLEEP AND NOT WAKE UP?: NO
2. HAVE YOU ACTUALLY HAD ANY THOUGHTS OF KILLING YOURSELF?: NO

## 2024-05-04 NOTE — DISCHARGE INSTRUCTIONS
[facility]. It was my pleasure to be involved in your care today.         As of today's visit, based on reasonable likelihood, that it is safe for you to be discharged back to your residence to follow-up as an outpatient for ongoing management of your medical problem. You should follow-up with any referrals / primary provider as soon as possible. The contacts (number, addresses) are listed below.         *Return to us immediately, if you feel you are getting worse, not getting better, or any new symptoms develop.         Make sure your pharmacy and primary doctor is aware of any new medications prescribed today.          It is your responsibility to contact as soon as possible, and follow through with, any referrals you were given today. We do recommend you inform them you are a [facility] follow-up patient, as often they can better accommodate your need to be seen, provided their schedules allow. We will, and have, made every effort to ensure you have access to adequate follow-up specialists available.          All problems may not be able to be fixed in one ER visit. This is why timely ongoing care is important, and this is a responsibility you share in. Further, you are free to follow up with any provider you choose, and this is not limited to our suggestion.          If cultures were obtained today, you will be contacted should anything result that would require further treatment. Please contact the ED at the number provided with questions.          Having trouble affording medications? Try [email or web address]! (This is not a hospital endorsed website, merely a recommendation based on my own personal experiences with [facility])

## 2024-05-04 NOTE — ED PROVIDER NOTES
Department of Emergency Medicine   ED  Provider Note  Admit Date/RoomTime: 5/4/2024  5:36 PM  ED Room: [facility]        History of Present Illness:  Chief Complaint   Patient presents with    Palpitations     This morning I started having heart rate of 160 I have no sob no chest pain some nausea no weakness          Artie Grove is a 73 y.o. male history of atrial fibs follows with Dr. Schwarz and Dr. Ewing presenting to the ED for increased heart rate, patient states he was seen evaluated yesterday.  Given medications heart rate improved was able to go home.  And then this morning woke up and was fluctuating between high and low and has been consistently high since about 330 this afternoon.  Reports he feels funny but no chest pain shortness of breath nausea vomiting dizziness.  He had sweating last night which is resolved.  No abdominal pain no pressure burning frequency of urination no rashes or sores.  No headache no generalized weakness    Review of Systems:   Pertinent positives and negatives are stated within HPI, all other systems reviewed and are negative.        --------------------------------------------- PAST HISTORY ---------------------------------------------  Past Medical History:  has a past medical history of Atrial fibrillation (Multi) and Personal history of other diseases of the musculoskeletal system and connective tissue.  Past Surgical History:  has a past surgical history that includes Tonsillectomy (08/12/2014).  Social History:  reports that he has never smoked. He has never used smokeless tobacco. He reports that he does not drink alcohol and does not use drugs.  Family History: family history includes Cancer in his mother; Heart disease in his father and mother.. Unless otherwise noted, family history is non contributory  The patient’s home medications have been reviewed.  Allergies: Patient has no known  "allergies.        ---------------------------------------------------PHYSICAL EXAM--------------------------------------    GENERAL APPEARANCE: Awake and alert.   VITAL SIGNS: As per the nurses' triage record.  Heart rate elevated at 163 EKG showed atrial flutter  HEENT: Normocephalic, atraumatic. Extraocular muscles are intact. Pupils equal round and reactive to light. Conjunctiva are pink. Negative scleral icterus. Mucous membranes are moist. Tongue in the midline. Pharynx was without erythema or exudates, uvula midline  NECK: Soft Nontender and supple, full gross ROM, no meningeal signs.  CHEST: Nontender to palpation. Clear to auscultation bilaterally. No rales, rhonchi, or wheezing.   HEART: S1, S2.  Irregular rate and rhythm. No murmurs, gallops or rubs.  Strong and equal pulses in the extremities.   ABDOMEN: Soft, nontender, nondistended, positive bowel sounds, no palpable masses.  MUSCULCSKELETAL:  Full gross active range of motion. Ambulating on own with no acute difficulties  NEUROLOGICAL: Awake, alert and oriented x 3. Power intact in the upper and lower extremities. Sensation is intact to light touch in the upper and lower extremities.   IMMUNOLOGICAL: No lymphatic streaking noted   DERM: No petechiae, rashes, or ecchymoses.          ------------------------- NURSING NOTES AND VITALS REVIEWED ---------------------------  The nursing notes within the ED encounter and vital signs as below have been reviewed by myself  /72   Pulse 64   Temp 36.5 °C (97.7 °F)   Resp 20   Ht 1.803 m (5' 11\")   Wt 95.2 kg (209 lb 14.1 oz)   SpO2 (!) 93%   BMI 29.27 kg/m²     Oxygen Saturation Interpretation: 97% room air    The cardiac monitor revealed atrial flutter with a heart rate in the 160s as interpreted by me. The cardiac monitor was ordered secondary to the patient's heart rate and to monitor the patient for dysrhythmia.       The patient’s available past medical records and past encounters were reviewed. "          -----------------------DIAGNOSTIC RESULTS------------------------  LABS:    Labs Reviewed   CBC WITH AUTO DIFFERENTIAL - Abnormal       Result Value    WBC 8.1      nRBC 0.0      RBC 6.73 (*)     Hemoglobin 19.3 (*)     Hematocrit 58.0 (*)     MCV 86      MCH 28.7      MCHC 33.3      RDW 17.7 (*)     Platelets 163      Neutrophils % 69.7      Immature Granulocytes %, Automated 0.1      Lymphocytes % 11.8      Monocytes % 16.2      Eosinophils % 1.5      Basophils % 0.7      Neutrophils Absolute 5.63 (*)     Immature Granulocytes Absolute, Automated 0.01      Lymphocytes Absolute 0.95      Monocytes Absolute 1.31 (*)     Eosinophils Absolute 0.12      Basophils Absolute 0.06     COMPREHENSIVE METABOLIC PANEL - Abnormal    Glucose 81      Sodium 140      Potassium 5.0      Chloride 106      Bicarbonate 22 (*)     Urea Nitrogen 30 (*)     Creatinine 1.00      eGFR 79      Calcium 10.0      Albumin 4.4      Alkaline Phosphatase 64      Total Protein 7.7      AST 35      Bilirubin, Total 0.5      ALT 43 (*)     Anion Gap 12     N-TERMINAL PROBNP - Abnormal    PROBNP 1,584 (*)     Narrative:     Reference ranges are based on clinical submission data. These ranges represent the 95th percentile of normal cut-off points. As NT Pro- BNP values approach 1000 pg/ml, clinical symptoms are more likely associated with CHF.   MAGNESIUM - Normal    Magnesium 2.60     TSH - Normal    Thyroid Stimulating Hormone 2.16     SERIAL TROPONIN, INITIAL ([facility]) - Normal    Troponin T, High Sensitivity 14     PROTIME-INR - Normal    Protime 10.9      INR 1.1      Narrative:     INR Therapeutic Range: 2.0-3.5   TROPONIN T SERIES, HIGH SENSITIVITY (0, 2 HR, 6 HR)    Narrative:     The following orders were created for panel order Troponin T Series, High Sensitivity (0, 2HR, 6HR).  Procedure                               Abnormality         Status                     ---------                               -----------         ------                      Serial Troponin, Initial...[[id number]]  Normal              Final result               Serial Troponin, 2 Hour ...[[id number]]                                                   Please view results for these tests on the individual orders.   SERIAL TROPONIN,  2 HOUR ([facility])       As interpreted by me, the above displayed labs are abnormal. All other labs obtained during this visit were within normal range or not returned as of this dictation.      EKG Interpretation    1736 EKG presented to me at 5:36 PM     EKG as interpreted by me shows atrial flutter with a 2:1 AV conduction at a ventricular rate of 164 bpm, normal axis, no STEMI.   [DH]           XR chest 1 view    (Results Pending)           XR chest 1 view    (Results Pending)           ------------------------------ ED COURSE/MEDICAL DECISION MAKING----------------------  Medical Decision Making:   Exam: A medically appropriate exam performed, outlined above, given the known history and presentation.    History obtained from: Review of medical record nursing notes patient      Social Determinants of Health considered during this visit: Takes care of himself at home       PAST MEDICAL HISTORY/Chronic Conditions Affecting Care     has a past medical history of Atrial fibrillation (Multi) and Personal history of other diseases of the musculoskeletal system and connective tissue.       CC/HPI Summary, Social Determinants of health, Records Reviewed, DDx, testing done/not done, ED Course, Reassessment, disposition considerations/shared decision making with patient, consults, disposition:   Presents with elevated heart rate  Plan  Chest x-ray  EKG  CBC  CMP  Magnesium  Troponin  Cardiac monitoring  Cardizem patient had improvement with heart rate yesterday  Normal saline  Cardizem drip  proBNP  PT/INR  TSH    Medical Decision Making/Differential Diagnosis:  Differentials include not limited to arrhythmia versus acute coronary syndrome versus electrolyte  abnormality versus dehydration versus CHF  Review:  Magnesium 2.6  proBNP 1584  Pro time 10.9  INR 1.1  TSH pending  Leukocytes 8.1  Hemoglobin 19.3  Troponin 14  Glucose 81  Electrolytes within normal limits with electrolyte imbalance noted  BUN 30  Creatinine 1  LFTs unremarkable except for ALT is elevated at 43  Patient presents with atrial fibs flutter with a heart rate of 164 improved with Cardizem has periods of pauses flipping into normal sinus rhythm.  Heart rate is improved patient becomes symptomatic with the rhythm change.  Patient continues to deny chest pain.  EKG initially a flutter at a rate of 160.  Repeat EKGs a flutter sinus arrhythmia.  Rate now controlled.  proBNP is elevated patient has no extremity edema noted.  Chest x-ray pending.  Coag panel within normal limits no elevation in white blood cell count.  Patient is not anemic troponin normal.  He is not hypoglycemic electrolytes within normal limits with electrolyte imbalance noted.  Normal renal function.  ALT slightly elevated otherwise LFTs unremarkable BUN elevated.  Unsure etiology of the elevated BUN.  Denies any rectal bleeding.  He is not anemic.  No signs of infection  Case was discussed with cardiology.  Recommended digoxin 500 mcg IV push and 200 mg of amiodarone p.o. and monitor.  Patient seen and evaluated with attending physician Dr. Higgins   Please see his note for final patient disposition reevaluation he will be assuming care of patient at this time due to the end of my shift at 1900  PROCEDURES  Unless otherwise noted below, none      CONSULTS:   None  Consult cardiology by attending physician    ED Course as of 05/04/24 1903   Sat May 04, 2024   1736 EKG presented to me at 5:36 PM     EKG as interpreted by me shows atrial flutter with a 2:1 AV conduction at a ventricular rate of 164 bpm, normal axis, no STEMI.   [DH]   1803 Patient received Cardizem bolus.  Heart rate dropped to 82 repeat EKG for rhythm change a flutter  patient was feeling lightheaded diaphoretic symptoms improved [TB]   1818 Patient had another episode of lightheadedness heart rate dropped to brief 4-second pause then brief a flutter then sinus rhythm arrhythmia still continues to have dizziness repeat EKG physician available [TB]   1845 Patient continues to have episodes of a flutter then pause to atrial fibs.  hold cardizem drip at this time. [TB]   1857 I spoke with Dr. Whitaker and discussed the case with him.  He recommended giving the patient 500 mcg of digoxin IV push and 200 mg of amiodarone p.o. and watch him for couple hours in the emergency department to see how he responds. [KW]      ED Course User Index  [DH] Garrison Hills MD  [KW] Herman Higgins DO  [TB] [facility] Barnhart         Diagnoses as of 05/04/24 1903   Atrial flutter, unspecified type (Multi)         This patient has remained hemodynamically stable during their ED course.      Critical Care: 31  Critical Care    Performed by: [facility] Barnhart  Authorized by: Herman Higgins DO    Critical care provider statement:     Critical care time (minutes):  31    Critical care time was exclusive of:  Separately billable procedures and treating other patients and teaching time    Critical care was necessary to treat or prevent imminent or life-threatening deterioration of the following conditions:  Cardiac failure    Critical care was time spent personally by me on the following activities:  Blood draw for specimens, development of treatment plan with patient or surrogate, evaluation of patient's response to treatment, examination of patient, interpretation of cardiac output measurements, ordering and performing treatments and interventions, ordering and review of laboratory studies, ordering and review of radiographic studies, pulse oximetry, re-evaluation of patient's condition and review of old charts  Requiring IV medication to control heart rate symptomatic with pauses consultation to  cardiology      Counseling:  The emergency provider has spoken with the patient family and discussed today’s results, in addition to providing specific details for the plan of care and counseling regarding the diagnosis and prognosis.  Questions are answered at this time and they are agreeable with the plan.         --------------------------------- IMPRESSION AND DISPOSITION ---------------------------------    IMPRESSION  1. Atrial flutter, unspecified type (Multi)        DISPOSITION  Disposition: Pending  Patient condition is guarded        NOTE: This report was transcribed using voice recognition software. Every effort was made to ensure accuracy; however, inadvertent computerized transcription errors may be present      LENA Barnhart-CNP  05/04/24 8482

## 2024-05-04 NOTE — ED PROVIDER NOTES
HPI   Chief Complaint   Patient presents with    Rapid Heart Rate     Pt presents to ed with chief complaint of rapid heart rate. Pt presents aox4, potentially unstable with HR of 160. Pt is afib rvr on 12 lead. Pt otherwise hemodynamically stable. Pt has Hx of Afib and takes metoprolol. Pt states he has frequent runs of rapid heart rate at home that usually subside quickly, this one however is persistent. NKDA.       HPI  73 mL with a history of atrial fibrillation presents with atrial fibrillation with rapid rate.  Patient states his symptoms started yesterday he thinks it got better throughout the night but has been persistent throughout the day.  He took an extra dose of his metoprolol tonight but it did not seem to help.  No chest pain.  No shortness of breath.  No dizziness or lightheadedness.  He is never had run this fast before.  No history of any recent illness.  No other complaints                  No data recorded                   Patient History   Past Medical History:   Diagnosis Date    Atrial fibrillation (Multi)     Personal history of other diseases of the musculoskeletal system and connective tissue     History of low back pain     Past Surgical History:   Procedure Laterality Date    TONSILLECTOMY  08/12/2014    Tonsillectomy     Family History   Problem Relation Name Age of Onset    Heart disease Mother      Cancer Mother      Heart disease Father       Social History     Tobacco Use    Smoking status: Never    Smokeless tobacco: Never   Substance Use Topics    Alcohol use: Never    Drug use: Never       Physical Exam   ED Triage Vitals [05/03/24 2158]   Temperature Heart Rate Respirations BP   36.6 °C (97.9 °F) (!) 160 20 (!) 111/94      Pulse Ox Temp Source Heart Rate Source Patient Position   98 % Oral Monitor Sitting      BP Location FiO2 (%)     Right arm --       Physical Exam  General:  Awake, alert, no acute distress.  Head: Normocephalic, Atraumatic  Neck: Supple, trachea midline, no  stridor  Skin: Warm and dry, no rashes   Lungs: Clear to auscultation bilaterally no acute respiratory distress, speaking in full sentences without difficulty  CV: Tachycardic rate, irregularly irregular rhythm with no obvious murmurs gallops rubs noted, no jugular venous distention, no pedal edema   Abdomen: Soft, nontender, nondistended, positive bowel sounds, no peritoneal signs  Neuro:  No gross focal neurologic deficits, NIH is 0  Musculoskeletal:  Full range of motion in all 4 extremities  Psychiatric:  Alert oriented x 3, Good insight into condition.  ED Course & MDM   ED Course as of 05/03/24 2350   Fri May 03, 2024   2151 My EKG interpretation:  Atrial fibrillation 159 bpm normal axis QTc 370 no ectopy or acute ischemic changes noted [KW]   2207 My EKG interpretation:  Atrial fibrillation 79 bpm normal axis QTc 408 no ectopy or acute ischemic changes noted [KW]      ED Course User Index  [KW] Herman Higgins DO         Diagnoses as of 05/03/24 2350   Paroxysmal atrial fibrillation (Multi)       Medical Decision Making  Patient was seen immediately upon arrival.  He was treated with 50 mg of Cardizem which brought his heart rate down to the mid 70s and eventually converted to a sinus rhythm.  His laboratory studies unremarkable.  Given his history of atrial fibrillation, stable vital signs, normal workup I do not feel requires admission to the hospital.  Advise continue with his home medications follow-up with his doctor return if his condition should worsen.  He is stable for discharge.    32 minutes of total critical care time includes review of laboratory data, radiology results, discussion with consultants, and monitoring for potential decompensation.  Interventions performed as documented above.  Total care time is exclusive of any separately billable procedures    Procedure  Procedures     Herman Higgins DO  05/03/24 2304       Herman Higgins DO  05/03/24 2350

## 2024-05-05 VITALS
OXYGEN SATURATION: 96 % | BODY MASS INDEX: 29.38 KG/M2 | TEMPERATURE: 97 F | HEIGHT: 71 IN | SYSTOLIC BLOOD PRESSURE: 119 MMHG | DIASTOLIC BLOOD PRESSURE: 67 MMHG | HEART RATE: 62 BPM | RESPIRATION RATE: 22 BRPM | WEIGHT: 209.88 LBS

## 2024-05-05 LAB
ANION GAP SERPL CALC-SCNC: 9 MMOL/L
BUN SERPL-MCNC: 26 MG/DL (ref 8–25)
CALCIUM SERPL-MCNC: 8.7 MG/DL (ref 8.5–10.4)
CHLORIDE SERPL-SCNC: 109 MMOL/L (ref 97–107)
CO2 SERPL-SCNC: 23 MMOL/L (ref 24–31)
CREAT SERPL-MCNC: 1 MG/DL (ref 0.4–1.6)
EGFRCR SERPLBLD CKD-EPI 2021: 79 ML/MIN/1.73M*2
ERYTHROCYTE [DISTWIDTH] IN BLOOD BY AUTOMATED COUNT: 16.1 % (ref 11.5–14.5)
GLUCOSE SERPL-MCNC: 96 MG/DL (ref 65–99)
HCT VFR BLD AUTO: 50.4 % (ref 41–52)
HGB BLD-MCNC: 16.5 G/DL (ref 13.5–17.5)
MCH RBC QN AUTO: 28.7 PG (ref 26–34)
MCHC RBC AUTO-ENTMCNC: 32.7 G/DL (ref 32–36)
MCV RBC AUTO: 88 FL (ref 80–100)
NRBC BLD-RTO: 0 /100 WBCS (ref 0–0)
PLATELET # BLD AUTO: 118 X10*3/UL (ref 150–450)
POTASSIUM SERPL-SCNC: 4.2 MMOL/L (ref 3.4–5.1)
RBC # BLD AUTO: 5.75 X10*6/UL (ref 4.5–5.9)
SODIUM SERPL-SCNC: 141 MMOL/L (ref 133–145)
WBC # BLD AUTO: 7.5 X10*3/UL (ref 4.4–11.3)

## 2024-05-05 PROCEDURE — 2500000001 HC RX 250 WO HCPCS SELF ADMINISTERED DRUGS (ALT 637 FOR MEDICARE OP): Performed by: NURSE PRACTITIONER

## 2024-05-05 PROCEDURE — 82374 ASSAY BLOOD CARBON DIOXIDE: CPT | Performed by: NURSE PRACTITIONER

## 2024-05-05 PROCEDURE — G0378 HOSPITAL OBSERVATION PER HR: HCPCS

## 2024-05-05 PROCEDURE — 2500000006 HC RX 250 W HCPCS SELF ADMINISTERED DRUGS (ALT 637 FOR ALL PAYERS): Performed by: NURSE PRACTITIONER

## 2024-05-05 PROCEDURE — 36415 COLL VENOUS BLD VENIPUNCTURE: CPT | Performed by: NURSE PRACTITIONER

## 2024-05-05 PROCEDURE — 94660 CPAP INITIATION&MGMT: CPT

## 2024-05-05 PROCEDURE — 97161 PT EVAL LOW COMPLEX 20 MIN: CPT | Mod: GP

## 2024-05-05 PROCEDURE — 85027 COMPLETE CBC AUTOMATED: CPT | Performed by: NURSE PRACTITIONER

## 2024-05-05 PROCEDURE — 99222 1ST HOSP IP/OBS MODERATE 55: CPT | Performed by: INTERNAL MEDICINE

## 2024-05-05 RX ORDER — AMIODARONE HYDROCHLORIDE 200 MG/1
200 TABLET ORAL
Qty: 30 TABLET | Refills: 0 | Status: SHIPPED | OUTPATIENT
Start: 2024-05-05 | End: 2024-05-31 | Stop reason: SDUPTHER

## 2024-05-05 RX ORDER — DIGOXIN 125 MCG
125 TABLET ORAL
Status: DISCONTINUED | OUTPATIENT
Start: 2024-05-05 | End: 2024-05-05 | Stop reason: HOSPADM

## 2024-05-05 RX ORDER — AMIODARONE HYDROCHLORIDE 200 MG/1
200 TABLET ORAL
Status: DISCONTINUED | OUTPATIENT
Start: 2024-05-05 | End: 2024-05-05 | Stop reason: HOSPADM

## 2024-05-05 RX ORDER — DIGOXIN 125 MCG
125 TABLET ORAL
Qty: 30 TABLET | Refills: 0 | Status: SHIPPED | OUTPATIENT
Start: 2024-05-05 | End: 2024-05-31 | Stop reason: SDUPTHER

## 2024-05-05 RX ADMIN — AMIODARONE HYDROCHLORIDE 200 MG: 200 TABLET ORAL at 16:02

## 2024-05-05 RX ADMIN — APIXABAN 5 MG: 5 TABLET, FILM COATED ORAL at 09:47

## 2024-05-05 RX ADMIN — METOPROLOL SUCCINATE 50 MG: 50 TABLET, EXTENDED RELEASE ORAL at 09:47

## 2024-05-05 RX ADMIN — ATORVASTATIN CALCIUM 20 MG: 20 TABLET, FILM COATED ORAL at 09:47

## 2024-05-05 RX ADMIN — DIGOXIN 125 MCG: 125 TABLET ORAL at 16:02

## 2024-05-05 ASSESSMENT — ACTIVITIES OF DAILY LIVING (ADL)
ADL_ASSISTANCE: INDEPENDENT
ADLS_ADDRESSED: NO

## 2024-05-05 ASSESSMENT — COGNITIVE AND FUNCTIONAL STATUS - GENERAL
WALKING IN HOSPITAL ROOM: A LITTLE
DAILY ACTIVITIY SCORE: 24
TURNING FROM BACK TO SIDE WHILE IN FLAT BAD: A LITTLE
CLIMB 3 TO 5 STEPS WITH RAILING: A LITTLE
CLIMB 3 TO 5 STEPS WITH RAILING: A LITTLE
MOBILITY SCORE: 21
MOBILITY SCORE: 23

## 2024-05-05 ASSESSMENT — PAIN SCALES - GENERAL
PAINLEVEL_OUTOF10: 0 - NO PAIN
PAINLEVEL_OUTOF10: 0 - NO PAIN

## 2024-05-05 NOTE — H&P
History Of Present Illness  Artie Grove is a 73 y.o. male presenting with palpitations.  Patient states that he has had palpitations over the last few days and was actually in the emergency room here at [facility] yesterday with the same problem and was discharged home.  He came back today with worsening palpitations and ECG showed that he was in A-fib with rapid rate.  He was given digoxin, amiodarone, and diltiazem in the emergency room along with 1 L of normal saline.  He states the palpitations have improved and currently is heart rate is in the 70s and 80s.  He denies any chest pain, shortness of breath, or abdominal pain.  He further denies any headache, dizziness, nausea/vomiting.     Past Medical History  Past Medical History:   Diagnosis Date    Atrial fibrillation (Multi)     Personal history of other diseases of the musculoskeletal system and connective tissue     History of low back pain       Surgical History  Past Surgical History:   Procedure Laterality Date    TONSILLECTOMY  08/12/2014    Tonsillectomy        Social History  He reports that he has never smoked. He has never used smokeless tobacco. He reports that he does not drink alcohol and does not use drugs.    Family History  Family History   Problem Relation Name Age of Onset    Heart disease Mother      Cancer Mother      Heart disease Father          Allergies  Patient has no known allergies.    Review of Systems  All systems reviewed and negative except as noted in HPI  Physical Exam  Constitutional: No acute distress, calm, cooperative  HEENT: PERRL, normocephalic, atraumatic, mucous membranes moist  Cardiovascular: Regular rhythm and rate,   Respiratory: Lungs clear to auscultation,   Gastrointestinal: Bowel sounds positive x 4, soft, nontender  Neurologic: Alert and oriented x 3 equal strength bilaterally  Musculoskeletal: Able to move all extremities, no edema  Skin: Warm, dry and intact  Last Recorded Vitals  Blood pressure  "131/87, pulse 88, temperature 36.5 °C (97.7 °F), resp. rate 18, height 1.803 m (5' 11\"), weight 95.2 kg (209 lb 14.1 oz), SpO2 94%.               Assessment/Plan   Principal Problem:    Atrial fibrillation with rapid ventricular response (Multi)  Active Problems:    Atrial flutter (Multi)    Atrial fibrillation with RVR  .  Eliquis 5 mg twice daily  .  Digoxin loaded in emergency room  .  Metoprolol succinate 50 mg twice daily  .  Consult cardiology and electrophysiology    Obstructive sleep apnea  .  CPAP at night    Hyperlipidemia  .  Atorvastatin    DVT prophylaxis  .  On Eliquis         I spent 75 minutes in the professional and overall care of this patient.      Dash Sky, APRN-CNP    "

## 2024-05-05 NOTE — CONSULTS
"Consults  History Of Present Illness:    Artie Grove is a 73 y.o. male presenting with palpitations.  He had ER evaluation Friday evening for paroxysmal atrial fibrillation with a rapid ventricular response, converted to sinus rhythm with intravenous diltiazem and was discharged home.  He then presented again to the emergency room last evening in atrial fibrillation with rapid ventricular response with intermittent conversion to normal sinus rhythm in the presence of intravenous diltiazem and oral beta-blocker therapy.    He follows with his electrophysiologist, Dr. Schwarz who saw him in the office March 7, 2024 and notes the following: \"The patient is known to me from previous encounters. He is a 73-year-old male with a history of paroxysmal atrial fibrillation longest episode lasting about a half a day. This in the setting of hypertension, borderline diabetes and likely coronary disease with an elevated coronary calcium score. He has no significant structural heart disease. His longest episode occurred while he was caring for his wife who just underwent knee surgery and he had quite a bit of stress and lack of sleep leading towards the episode. He remains quite reluctant to pursue antiarrhythmic drug therapy as well as catheter-based therapy. \"    Also sees his general cardiologist, Dr. Ewing who noted he was not on anticoagulation due to a [id number] score of 2.  In the setting where he has borderline diabetes and an elevated coronary calcium score 549, [id number] score may be assessed as 3.     Last Recorded Vitals:  Vitals:    05/05/24 0000 05/05/24 0152 05/05/24 0409 05/05/24 0741   BP:  94/79 131/76 136/79   BP Location:  Left arm Left arm Left arm   Patient Position:  Lying Lying Lying   Pulse:  58 61 67   Resp: 14 15 19 14   Temp:   36.8 °C (98.2 °F) 36.2 °C (97.2 °F)   TempSrc:   Temporal Temporal   SpO2:  96% 94% 95%   Weight:       Height:           Last Labs:  CBC - 5/5/2024:  6:30 " AM  7.5 16.5 118    50.4      CMP - 5/5/2024:  6:30 AM  8.7 7.7 35 --- 0.5   _ 4.4 43 64      PTT - No results in last year.  1.1   10.9 _     LDL Calculated   Date/Time Value Ref Range Status   09/22/2023 08:10 AM 63 (L) 65 - 130 MG/DL Final   10/25/2022 08:37  65 - 130 MG/DL Final   11/29/2021 07:46  (H) 65 - 130 MG/DL Final      Last I/O:  I/O last 3 completed shifts:  In: - (0 mL/kg)   Out: 400 (4.2 mL/kg) [Urine:400 (0.1 mL/kg/hr)]  Weight: 95.2 kg     Past Cardiology Tests (Last 3 Years):  EKG:  ECG 12 lead (Clinic Performed) 03/07/2024      ECG 12 lead (Clinic Performed) 01/31/2024    Echo:  Transthoracic Echo Complete 02/05/2024    Ejection Fractions:  EF   Date/Time Value Ref Range Status   02/05/2024 11:46 AM 61 %      Cath:  No results found for this or any previous visit from the past 1095 days.    Stress Test:  No results found for this or any previous visit from the past 1095 days.    Cardiac Imaging:  No results found for this or any previous visit from the past 1095 days.      Past Medical History:  He has a past medical history of Atrial fibrillation (Multi) and Personal history of other diseases of the musculoskeletal system and connective tissue.    Past Surgical History:  He has a past surgical history that includes Tonsillectomy (08/12/2014).      Social History:  He reports that he has never smoked. He has never used smokeless tobacco. He reports that he does not drink alcohol and does not use drugs.    Family History:  Family History   Problem Relation Name Age of Onset    Heart disease Mother      Cancer Mother      Heart disease Father          Allergies:  Patient has no known allergies.    Inpatient Medications:  Scheduled medications   Medication Dose Route Frequency    apixaban  5 mg oral BID    atorvastatin  20 mg oral Daily    metoprolol succinate XL  50 mg oral BID     PRN medications   Medication    acetaminophen    Or    acetaminophen    Or    acetaminophen    magnesium  hydroxide    ondansetron ODT    Or    ondansetron    oxygen     Continuous Medications   Medication Dose Last Rate     Outpatient Medications:  Current Outpatient Medications   Medication Instructions    apixaban (Eliquis) 5 mg tablet TAKE 1 TABLET BY MOUTH TWO TIMES A DAY    atorvastatin (Lipitor) 20 mg tablet 1 tablet, oral, Daily, For 90 days    calcium carb,gluc/mag ox,gluc (CALCIUM MAGNESIUM ORAL) 2 times daily,  3 tablets orally twice a day    docusate/folic acid/MV-iron/mn (MULTI MIN-FE-FOLIC ACID-DSS ORAL) 1 tablet, oral, 3 times daily    Lactobacillus acidophilus (PROBIOTIC ORAL) oral    metoprolol tartrate (LOPRESSOR) 25 mg, oral, 3 times daily, For 90 days    NON FORMULARY 2 times daily, L-Theanine 100 mg <BR>Patient si cap(s) Orally twice a day<BR><BR><BR>    NON FORMULARY Doterra    phosphatidylserine 100 mg capsule 1 capsule, oral, Daily       Physical Exam:  Neck:  Normal jugular venous pressure, carotid upstrokes brisk with no bruits  Lungs:  Clear to auscultation without wheezing or rhonchi or rales  Heart:  Regular rate and rhythm normal S1 and S2, no murmurs gallops rubs or clicks, PMI normal, no RV lift  Abdomen:  Soft, good bowel sounds, nontender, no hepatosplenomegaly, no pulsatile mass or bruits.  Extremities:  Good radial, femoral, pedal pulses without pretibial edema  Neurological:  No focal sensory or motor deficits, pupils equal and reactive     Assessment/Plan   #1 paroxysmal atrial fibrillation, recurrent and symptomatic with rapid ventricular response during episodes of atrial fibrillation.  [id number] score is 3.    2.  Elevated coronary artery calcium score 549    5/5: He is afebrile, pulse 67, respirations 14, blood pressure 136/79 pulse is 95%. We will continue his Eliquis anticoagulation, and use Toprol as well as low-dose digoxin 0.125 mg daily for atrial fibrillation rate control, add very low-dose amiodarone 200 mg daily for maintenance of sinus rhythm and he can discuss  other options for chronic antiarrhythmic therapy such as Multaq, with his electrophysiologist Dr. Schwarz.      He had echocardiography in February 2024 showing normal left ventricular systolic function and no other structural heart disease, and he had a nonischemic nuclear stress test in August 2023 with left ventricular ejection fraction 71%.  Continue to comply with his sleep mask for obstructive sleep apnea.    Code Status:  Full Code    I spent 45 minutes in the professional and overall care of this patient.        Oseas Lancaster, DO

## 2024-05-05 NOTE — NURSING NOTE
RN paged Dr Rosales regarding digoxin dose at 0200. Patient has maintained sinus evgeny HR 59, for the past hour. Medication DC medication. Patient remains on tele with no symptoms at this time

## 2024-05-05 NOTE — DISCHARGE SUMMARY
Discharge Diagnosis  Atrial fibrillation with rapid ventricular response (Multi)    Issues Requiring Follow-Up  As above    Discharge Meds     Your medication list        START taking these medications        Instructions Last Dose Given Next Dose Due   amiodarone 200 mg tablet  Commonly known as: Pacerone      Take 1 tablet (200 mg) by mouth once daily in the evening. Take with meals.       digoxin 125 MCG tablet  Commonly known as: Lanoxin      Take 1 tablet (125 mcg) by mouth once daily in the evening. Take with meals.              CONTINUE taking these medications        Instructions Last Dose Given Next Dose Due   atorvastatin 20 mg tablet  Commonly known as: Lipitor           CALCIUM MAGNESIUM ORAL           Eliquis 5 mg tablet  Generic drug: apixaban      TAKE 1 TABLET BY MOUTH TWO TIMES A DAY       metoprolol tartrate 25 mg tablet  Commonly known as: Lopressor      Take 1 tablet (25 mg) by mouth 3 times a day. For 90 days       MULTI MIN-FE-FOLIC ACID-DSS ORAL           NON FORMULARY           NON FORMULARY           phosphatidylserine 100 mg capsule           PROBIOTIC ORAL                     Where to Get Your Medications        These medications were sent to [id number] [place] [id number] [address] [address]      Phone: [phone]   amiodarone 200 mg tablet  digoxin 125 MCG tablet         Test Results Pending At Discharge  Pending Labs       No current pending labs.            Hospital Course   Admitted for further management. Seen by Cardiology. Given digoxin and amiodarone. Now back in NSR, HR in to 60-70 range. Discussed in detail with Dr. Lancaster. Patient will continue his normal metoprolol dose he was taking at home with the addition of digoxin 0.125mg and amiodarone 200mg with evening meal. Educated patient to check his pulse before each dose of digoxin and hold if HR less than 60. He will follow up with Dr. Schwarz, patient instructed to call for  appointment on Monday morning. Labs and VS are stable. DC home today.     Case and plan was discussed with patient he is agreeable.   Case and plan was also discussed with my collaborating physician.     Time spent 35 minutes.     Pertinent Physical Exam At Time of Discharge  Physical Exam    Patient seen and examined. Resting in bed. Feels ok, no further palpitations. He denies any SOB or chest pain.     General: Alert and oriented x3, pleasant.   Cardiac: Regular rate and rhythm, S1/S2 , no murmur.   Pulmonary: Clear to auscultation on room air.   Abdomen: Soft, round, nontender. BS +x4.   Extremities: No edema.  Skin: No rashes or lesions.      Outpatient Follow-Up  Future Appointments   Date Time Provider Department Center   9/17/2024 11:30 AM Jatinder Schwarz MD [id number] None   9/25/2024  2:45 PM Alvarado Ewing DO [id number]     Call Dr. Schwarz's office Monday morning to make an appointment.     Rocio Bella, LENA-CNP

## 2024-05-05 NOTE — PROGRESS NOTES
"Physical Therapy    Physical Therapy Evaluation    Patient Name: Artie Grove  MRN: [id number]  Today's Date: 5/5/2024   Time Calculation  Start Time: 1200  Stop Time: 1230  Time Calculation (min): 30 min    Assessment/Plan   PT Assessment  PT Assessment Results: Decreased endurance, Decreased mobility  Rehab Prognosis: Good  Evaluation/Treatment Tolerance: Patient tolerated treatment well  Medical Staff Made Aware: Yes  Strengths: Ability to acquire knowledge, Premorbid level of function, Support and attitude of living partners  Barriers to Participation: Comorbidities, Housing layout  End of Session Communication: Bedside nurse  Assessment Comment: pt left seated in bed side chair with all needs in reach. Pt reports feeling \" shaky, like his heart was beating fast, and his stomach was upset\" Nurse was notified, pt agreed to stay up in chair and order lunch to see if that helped. His resting HR at end of session was 67bpm.  End of Session Patient Position: Up in chair, Alarm off, not on at start of session  IP OR SWING BED PT PLAN  Inpatient or Swing Bed: Inpatient  PT Plan  PT Plan: Skilled PT  PT Frequency: 4 times per week  PT Discharge Recommendations: Low intensity level of continued care  PT Recommended Transfer Status: Stand by assist, Contact guard  PT - OK to Discharge: Yes    Subjective   Pt reports feeling better, he wants to return home only if HR is better. He does not want to have to come back again.     General Visit Information:  General  Reason for Referral: AFIB decrease in mobility  Referred By: Dr. Rosales  Past Medical History Relevant to Rehab: AFIB and back pain  Family/Caregiver Present: No  Prior to Session Communication: Bedside nurse  Patient Position Received: Bed, 2 rail up  Preferred Learning Style: verbal  General Comment: pt was resting in bed and agreeable to PT eval  Home Living:  Home Living  Type of Home: House  Lives With: Spouse  Home Adaptive Equipment: None (wife has " a RW)  Home Layout: Multi-level  Home Access: Stairs to enter without rails  Entrance Stairs-Rails: None  Entrance Stairs-Number of Steps: 2  Bathroom Shower/Tub: Walk-in shower  Bathroom Toilet: Standard  Bathroom Equipment: Grab bars in shower  Home Living Comments: 15 steps and 1 rail to bedroom  Prior Level of Function:  Prior Function Per Pt/Caregiver Report  Level of [place]: Independent with ADLs and functional transfers  Receives Help From: Family (wife as needed)  ADL Assistance: Independent  Ambulatory Assistance: Independent  Vocational: Retired  Leisure: likes to garden  Hand Dominance: Right  Prior Function Comments: IND was driving  Precautions:   Fall risk  Vital Signs:  Vital Signs  Heart Rate: 64  Heart Rate Source: Monitor    Objective   Pain:  Pain Assessment  Pain Score: 0 - No pain  Cognition:  Cognition  Overall Cognitive Status: Within Functional Limits    General Assessments:    Activity Tolerance  Endurance: Decreased tolerance for upright activites  Rate of Perceived Exertion (RPE): 3    Strength  Strength Comments: [facility]  Strength  Strength Comments: [facility]       Coordination  Movements are Fluid and Coordinated: Yes    Postural Control  Postural Control: Within Functional Limits    Static Sitting Balance  Static Sitting-Balance Support: No upper extremity supported  Static Sitting-Comment/Number of Minutes: IND  Dynamic Sitting Balance  Dynamic Sitting-Balance Support: No upper extremity supported  Dynamic Sitting-Comments: IND    Static Standing Balance  Static Standing-Balance Support: No upper extremity supported  Static Standing-Level of Assistance: Contact guard  Static Standing-Comment/Number of Minutes: slight unsteadiness  Dynamic Standing Balance  Dynamic Standing-Balance Support: No upper extremity supported  Dynamic Standing-Comments: CGA x 1, 1 major LOB when walking, required PT correction for safety  Functional Assessments:  Bed Mobility  Bed Mobility: Yes  Bed  Mobility 1  Bed Mobility 1: Supine to sitting, Sitting to supine, Rolling right, Rolling left  Level of Assistance 1: Modified independent  Bed Mobility Comments 1: head of bed elvated and use of railing    Transfers  Transfer: Yes  Transfer 1  Transfer From 1: Bed to  Transfer to 1: Sit, Stand  Technique 1: Sit to stand  Transfer Level of Assistance 1: Contact guard  Trials/Comments 1: instructions for proper hand placement and sequenicng for safety.  Transfers 2  Transfer From 2: Chair with arms to  Transfer to 2: Sit, Stand  Technique 2: Sit to stand  Transfer Level of Assistance 2: Contact guard  Trials/Comments 2: instruction for proper hand placement and sequencing for safety.    Ambulation/Gait Training  Ambulation/Gait Training Performed: Yes  Ambulation/Gait Training 1  Surface 1: Level tile  Device 1: No device  Assistance 1: Contact guard, Minimum assistance  Quality of Gait 1: Decreased step length (slow cautious gait, unsteady at times, 1 x major LOB, reports feeling off. PT HR was 85-88 bpm while walking. at rest he was 64bpm.)  Comments/Distance (ft) 1: 75 ft x 2, standing resting brake between trials.    Stairs  Stairs: No (pt unable to make it to PT stair case at this time due to LOB with walking.)    Outcome Measures:  [facility] Basic Mobility  Turning from your back to your side while in a flat bed without using bedrails: None  Moving from lying on your back to sitting on the side of a flat bed without using bedrails: A little  Moving to and from bed to chair (including a wheelchair): None  Standing up from a chair using your arms (e.g. wheelchair or bedside chair): None  To walk in hospital room: A little  Climbing 3-5 steps with railing: A little  Basic Mobility - Total Score: 21    Encounter Problems       Encounter Problems (Active)       Balance       LTG - Patient will maintain good  standing balance to maintain his safety with transfers and gait.  (Progressing)       Start:  05/05/24     Expected End:  05/19/24               Mobility       LTG - Patient will ambulate 150 feet independently with no loss of balance.  (Progressing)       Start:  05/05/24            LTG - Patient will ascend/descend 2 steps with no rails at MOD IND level with use of least restricted assistive device, demonstrating proper sequencing for safety. (Progressing)       Start:  05/05/24            STG - Patient will ascend and descend 15 steps with 1 rail at mod IND level, demonstrating proper sequencing for safety. (Progressing)       Start:  05/05/24               PT Transfers       STG - Patient will perform bed mobility at IND level with proper sequencing for safety. (Progressing)       Start:  05/05/24            STG - Patient will transfers at IND level with proper sequencing and hand placement for safety.  (Progressing)       Start:  05/05/24                   Education Documentation  Mobility Training, taught by Noemi Centeno PT at 5/5/2024 12:51 PM.  Learner: Patient  Readiness: Eager  Method: Explanation, Demonstration  Response: Verbalizes Understanding    Education Comments  No comments found.      PT educated patient on purpose of PT eval and POC, on use of staff assist to maintain his safety with transfers and gait. Educated patient on importance of sitting up in bed side chair. Pt agrees.

## 2024-05-06 LAB
ATRIAL RATE: 318 BPM
ATRIAL RATE: 328 BPM
ATRIAL RATE: 332 BPM
ATRIAL RATE: 67 BPM
P AXIS: -86 DEGREES
P AXIS: 194 DEGREES
P AXIS: 245 DEGREES
P AXIS: 64 DEGREES
P OFFSET: 187 MS
P OFFSET: 202 MS
P OFFSET: 205 MS
P ONSET: 137 MS
P ONSET: 149 MS
P ONSET: 152 MS
PR INTERVAL: 160 MS
Q ONSET: 217 MS
Q ONSET: 220 MS
Q ONSET: 220 MS
Q ONSET: 223 MS
QRS COUNT: 11 BEATS
QRS COUNT: 13 BEATS
QRS COUNT: 26 BEATS
QRS COUNT: 27 BEATS
QRS DURATION: 80 MS
QRS DURATION: 82 MS
QRS DURATION: 84 MS
QRS DURATION: 98 MS
QT INTERVAL: 226 MS
QT INTERVAL: 228 MS
QT INTERVAL: 344 MS
QT INTERVAL: 374 MS
QTC CALCULATION(BAZETT): 370 MS
QTC CALCULATION(BAZETT): 373 MS
QTC CALCULATION(BAZETT): 395 MS
QTC CALCULATION(BAZETT): 404 MS
QTC FREDERICIA: 315 MS
QTC FREDERICIA: 316 MS
QTC FREDERICIA: 383 MS
QTC FREDERICIA: 388 MS
R AXIS: 0 DEGREES
R AXIS: 1 DEGREES
R AXIS: 19 DEGREES
R AXIS: 30 DEGREES
T AXIS: 21 DEGREES
T AXIS: 46 DEGREES
T AXIS: 57 DEGREES
T AXIS: 76 DEGREES
T OFFSET: 333 MS
T OFFSET: 334 MS
T OFFSET: 395 MS
T OFFSET: 404 MS
VENTRICULAR RATE: 159 BPM
VENTRICULAR RATE: 164 BPM
VENTRICULAR RATE: 67 BPM
VENTRICULAR RATE: 83 BPM

## 2024-05-06 NOTE — TELEPHONE ENCOUNTER
Patient called in for ED follow up due to AFIB . Scheduled 5.30 . Does patient need to be seen any sooner?

## 2024-05-30 ENCOUNTER — OFFICE VISIT (OUTPATIENT)
Dept: CARDIOLOGY | Facility: CLINIC | Age: 74
End: 2024-05-30
Payer: MEDICARE

## 2024-05-30 ENCOUNTER — LAB (OUTPATIENT)
Dept: LAB | Facility: LAB | Age: 74
End: 2024-05-30
Payer: MEDICARE

## 2024-05-30 VITALS — DIASTOLIC BLOOD PRESSURE: 68 MMHG | HEART RATE: 60 BPM | SYSTOLIC BLOOD PRESSURE: 112 MMHG

## 2024-05-30 DIAGNOSIS — I48.0 PAROXYSMAL ATRIAL FIBRILLATION (MULTI): ICD-10-CM

## 2024-05-30 DIAGNOSIS — I48.0 PAROXYSMAL ATRIAL FIBRILLATION (MULTI): Primary | ICD-10-CM

## 2024-05-30 LAB
ALBUMIN SERPL BCP-MCNC: 4.3 G/DL (ref 3.4–5)
ALP SERPL-CCNC: 53 U/L (ref 33–136)
ALT SERPL W P-5'-P-CCNC: 40 U/L (ref 10–52)
ANION GAP SERPL CALC-SCNC: 15 MMOL/L (ref 10–20)
AST SERPL W P-5'-P-CCNC: 33 U/L (ref 9–39)
BILIRUB SERPL-MCNC: 0.6 MG/DL (ref 0–1.2)
BUN SERPL-MCNC: 25 MG/DL (ref 6–23)
CALCIUM SERPL-MCNC: 9.8 MG/DL (ref 8.6–10.3)
CHLORIDE SERPL-SCNC: 103 MMOL/L (ref 98–107)
CO2 SERPL-SCNC: 22 MMOL/L (ref 21–32)
CREAT SERPL-MCNC: 1.18 MG/DL (ref 0.5–1.3)
EGFRCR SERPLBLD CKD-EPI 2021: 65 ML/MIN/1.73M*2
ERYTHROCYTE [DISTWIDTH] IN BLOOD BY AUTOMATED COUNT: 15.9 % (ref 11.5–14.5)
GLUCOSE SERPL-MCNC: 125 MG/DL (ref 74–99)
HCT VFR BLD AUTO: 54.2 % (ref 41–52)
HGB BLD-MCNC: 17.4 G/DL (ref 13.5–17.5)
MCH RBC QN AUTO: 28.9 PG (ref 26–34)
MCHC RBC AUTO-ENTMCNC: 32.1 G/DL (ref 32–36)
MCV RBC AUTO: 90 FL (ref 80–100)
NRBC BLD-RTO: 0 /100 WBCS (ref 0–0)
PLATELET # BLD AUTO: 161 X10*3/UL (ref 150–450)
POTASSIUM SERPL-SCNC: 4.5 MMOL/L (ref 3.5–5.3)
PROT SERPL-MCNC: 7.4 G/DL (ref 6.4–8.2)
RBC # BLD AUTO: 6.03 X10*6/UL (ref 4.5–5.9)
SODIUM SERPL-SCNC: 135 MMOL/L (ref 136–145)
TSH SERPL-ACNC: 1.97 MIU/L (ref 0.44–3.98)
WBC # BLD AUTO: 7.7 X10*3/UL (ref 4.4–11.3)

## 2024-05-30 PROCEDURE — 93005 ELECTROCARDIOGRAM TRACING: CPT | Performed by: INTERNAL MEDICINE

## 2024-05-30 PROCEDURE — 1159F MED LIST DOCD IN RCRD: CPT | Performed by: INTERNAL MEDICINE

## 2024-05-30 PROCEDURE — 93010 ELECTROCARDIOGRAM REPORT: CPT | Performed by: INTERNAL MEDICINE

## 2024-05-30 PROCEDURE — 36415 COLL VENOUS BLD VENIPUNCTURE: CPT

## 2024-05-30 PROCEDURE — 84443 ASSAY THYROID STIM HORMONE: CPT

## 2024-05-30 PROCEDURE — 84480 ASSAY TRIIODOTHYRONINE (T3): CPT

## 2024-05-30 PROCEDURE — 85027 COMPLETE CBC AUTOMATED: CPT

## 2024-05-30 PROCEDURE — 80053 COMPREHEN METABOLIC PANEL: CPT

## 2024-05-30 PROCEDURE — 99214 OFFICE O/P EST MOD 30 MIN: CPT | Performed by: INTERNAL MEDICINE

## 2024-05-30 NOTE — H&P (VIEW-ONLY)
Chief Complaint   Patient presents with    Atrial Fibrillation     Recent ED visit on 5/4 for afib RVR            The patient is well-known to me.  He is a 73-year-old male with a history of paroxysmal atrial fibrillation that has become more troublesome for him with more frequent episodes with longer duration and faster rates.  He presented to the emergency room a couple months ago and he was placed on amiodarone.  He is now anxious to have more definitive therapy.  This in the setting of preserved LV systolic function.  He is appropriately anticoagulated.  He has never had cardioversion or catheter-based therapy.    Atrial Fibrillation  Past medical history includes atrial fibrillation.        Active Ambulatory Problems     Diagnosis Date Noted    Atrial fibrillation (Multi) 09/18/2023    Claudication (CMS-HCC) 09/18/2023    Electrocardiogram abnormal 09/18/2023    Hyperlipidemia 09/18/2023    Obstructive sleep apnea (adult) (pediatric) 09/18/2023    Palpitations 09/18/2023    ASHD (arteriosclerotic heart disease) 01/31/2024    Atrial flutter (Multi) 05/04/2024    Atrial fibrillation with rapid ventricular response (Multi) 05/04/2024     Resolved Ambulatory Problems     Diagnosis Date Noted    No Resolved Ambulatory Problems     Past Medical History:   Diagnosis Date    Personal history of other diseases of the musculoskeletal system and connective tissue         Review of Systems   All other systems reviewed and are negative.       Objective     There were no vitals filed for this visit.     Vitals and nursing note reviewed.   Constitutional:       Appearance: Healthy appearance.   HENT:    Mouth/Throat:      Pharynx: Oropharynx is clear.   Pulmonary:      Effort: Pulmonary effort is normal.      Breath sounds: Normal breath sounds.   Cardiovascular:      PMI at left midclavicular line. Normal rate. Regular rhythm. Normal S1. Normal S2.       Murmurs: There is a grade 1/6 holosystolic murmur.      No gallop.  No  click. No rub.   Pulses:     Intact distal pulses.   Edema:     Peripheral edema absent.   Abdominal:      General: Bowel sounds are normal.   Musculoskeletal:      Cervical back: Normal range of motion. Skin:     General: Skin is warm and dry.   Neurological:      General: No focal deficit present.      Mental Status: Alert and oriented to person, place and time.            Lab Review:   Admission on 05/04/2024, Discharged on 05/05/2024   Component Date Value    WBC 05/04/2024 8.1     nRBC 05/04/2024 0.0     RBC 05/04/2024 6.73 (H)     Hemoglobin 05/04/2024 19.3 (H)     Hematocrit 05/04/2024 58.0 (H)     MCV 05/04/2024 86     MCH 05/04/2024 28.7     MCHC 05/04/2024 33.3     RDW 05/04/2024 17.7 (H)     Platelets 05/04/2024 163     Neutrophils % 05/04/2024 69.7     Immature Granulocytes %,* 05/04/2024 0.1     Lymphocytes % 05/04/2024 11.8     Monocytes % 05/04/2024 16.2     Eosinophils % 05/04/2024 1.5     Basophils % 05/04/2024 0.7     Neutrophils Absolute 05/04/2024 5.63 (H)     Immature Granulocytes Ab* 05/04/2024 0.01     Lymphocytes Absolute 05/04/2024 0.95     Monocytes Absolute 05/04/2024 1.31 (H)     Eosinophils Absolute 05/04/2024 0.12     Basophils Absolute 05/04/2024 0.06     Glucose 05/04/2024 81     Sodium 05/04/2024 140     Potassium 05/04/2024 5.0     Chloride 05/04/2024 106     Bicarbonate 05/04/2024 22 (L)     Urea Nitrogen 05/04/2024 30 (H)     Creatinine 05/04/2024 1.00     eGFR 05/04/2024 79     Calcium 05/04/2024 10.0     Albumin 05/04/2024 4.4     Alkaline Phosphatase 05/04/2024 64     Total Protein 05/04/2024 7.7     AST 05/04/2024 35     Bilirubin, Total 05/04/2024 0.5     ALT 05/04/2024 43 (H)     Anion Gap 05/04/2024 12     Magnesium 05/04/2024 2.60     Ventricular Rate 05/04/2024 164     Atrial Rate 05/04/2024 328     QRS Duration 05/04/2024 84     QT Interval 05/04/2024 226     QTC Calculation(Bazett) 05/04/2024 373     P Axis 05/04/2024 245     R Axis 05/04/2024 30     T [place] 05/04/2024  57     QRS Count 05/04/2024 27     Q Onset 05/04/2024 220     P Onset 05/04/2024 152     P Offset 05/04/2024 202     T Offset 05/04/2024 333     QTC Fredericia 05/04/2024 316     Ventricular Rate 05/04/2024 83     Atrial Rate 05/04/2024 332     QRS Duration 05/04/2024 98     QT Interval 05/04/2024 344     QTC Calculation(Bazett) 05/04/2024 404     P Axis 05/04/2024 -86     R Axis 05/04/2024 0     T Axis 05/04/2024 21     QRS Count 05/04/2024 13     Q Onset 05/04/2024 223     T Offset 05/04/2024 395     QTC Fredericia 05/04/2024 383     PROBNP 05/04/2024 1,584 (H)     Thyroid Stimulating Horm* 05/04/2024 2.16     Troponin T, High Sensiti* 05/04/2024 14     Protime 05/04/2024 10.9     INR 05/04/2024 1.1     Troponin T, High Sensiti* 05/04/2024 14     Ventricular Rate 05/04/2024 67     Atrial Rate 05/04/2024 67     MD Interval 05/04/2024 160     QRS Duration 05/04/2024 82     QT Interval 05/04/2024 374     QTC Calculation(Bazett) 05/04/2024 395     P [place] 05/04/2024 64     R [place] 05/04/2024 19     T Axis 05/04/2024 46     QRS Count 05/04/2024 11     Q Onset 05/04/2024 217     P Onset 05/04/2024 137     P Offset 05/04/2024 187     T Offset 05/04/2024 404     QTC Fredericia 05/04/2024 388     Ventricular Rate 05/04/2024 159     Atrial Rate 05/04/2024 318     QRS Duration 05/04/2024 80     QT Interval 05/04/2024 228     QTC Calculation(Bazett) 05/04/2024 370     P Axis 05/04/2024 194     R Axis 05/04/2024 1     T Axis 05/04/2024 76     QRS Count 05/04/2024 26     Q Onset 05/04/2024 220     P Onset 05/04/2024 149     P Offset 05/04/2024 205     T Offset 05/04/2024 334     QTC Fredericia 05/04/2024 315     Troponin T, High Sensiti* 05/04/2024 12     WBC 05/05/2024 7.5     nRBC 05/05/2024 0.0     RBC 05/05/2024 5.75     Hemoglobin 05/05/2024 16.5     Hematocrit 05/05/2024 50.4     MCV 05/05/2024 88     MCH 05/05/2024 28.7     MCHC 05/05/2024 32.7     RDW 05/05/2024 16.1 (H)     Platelets 05/05/2024 118 (L)     Glucose  05/05/2024 96     Sodium 05/05/2024 141     Potassium 05/05/2024 4.2     Chloride 05/05/2024 109 (H)     Bicarbonate 05/05/2024 23 (L)     Urea Nitrogen 05/05/2024 26 (H)     Creatinine 05/05/2024 1.00     eGFR 05/05/2024 79     Calcium 05/05/2024 8.7     Anion Gap 05/05/2024 9    Admission on 05/03/2024, Discharged on 05/03/2024   Component Date Value    WBC 05/03/2024 8.7     nRBC 05/03/2024 0.0     RBC 05/03/2024 6.63 (H)     Hemoglobin 05/03/2024 18.9 (H)     Hematocrit 05/03/2024 56.4 (H)     MCV 05/03/2024 85     MCH 05/03/2024 28.5     MCHC 05/03/2024 33.5     RDW 05/03/2024 17.3 (H)     Platelets 05/03/2024 170     Neutrophils % 05/03/2024 64.1     Immature Granulocytes %,* 05/03/2024 0.2     Lymphocytes % 05/03/2024 17.9     Monocytes % 05/03/2024 14.6     Eosinophils % 05/03/2024 2.4     Basophils % 05/03/2024 0.8     Neutrophils Absolute 05/03/2024 5.56 (H)     Immature Granulocytes Ab* 05/03/2024 0.02     Lymphocytes Absolute 05/03/2024 1.55     Monocytes Absolute 05/03/2024 1.27 (H)     Eosinophils Absolute 05/03/2024 0.21     Basophils Absolute 05/03/2024 0.07     Glucose 05/03/2024 136 (H)     Sodium 05/03/2024 137     Potassium 05/03/2024 4.3     Chloride 05/03/2024 103     Bicarbonate 05/03/2024 20 (L)     Urea Nitrogen 05/03/2024 29 (H)     Creatinine 05/03/2024 1.00     eGFR 05/03/2024 79     Calcium 05/03/2024 10.0     Anion Gap 05/03/2024 14        ECG:  Normal sinus rhythm at 60 bpm [place] interval 160 ms QRS duration 96 ms QTc 400 ms    Assessment/plan:  History as above.  I discussed options including catheter-based therapy including a trial of [place] with pulsed field ablation.  He prefers to do this here with cryoablation..  We will schedule him in the coming weeks.    Problem List Items Addressed This Visit       Atrial fibrillation (Multi) - Primary    Relevant Orders    ECG 12 lead (Clinic Performed)    TSH with reflex to Free T4 if abnormal    T3    Comprehensive metabolic panel     XR chest 2 views    Complete Pulmonary Function Test (Spirometry/DLCO/Lung Volumes)

## 2024-05-30 NOTE — PROGRESS NOTES
Chief Complaint   Patient presents with    Atrial Fibrillation     Recent ED visit on 5/4 for afib RVR            The patient is well-known to me.  He is a 73-year-old male with a history of paroxysmal atrial fibrillation that has become more troublesome for him with more frequent episodes with longer duration and faster rates.  He presented to the emergency room a couple months ago and he was placed on amiodarone.  He is now anxious to have more definitive therapy.  This in the setting of preserved LV systolic function.  He is appropriately anticoagulated.  He has never had cardioversion or catheter-based therapy.    Atrial Fibrillation  Past medical history includes atrial fibrillation.        Active Ambulatory Problems     Diagnosis Date Noted    Atrial fibrillation (Multi) 09/18/2023    Claudication (CMS-HCC) 09/18/2023    Electrocardiogram abnormal 09/18/2023    Hyperlipidemia 09/18/2023    Obstructive sleep apnea (adult) (pediatric) 09/18/2023    Palpitations 09/18/2023    ASHD (arteriosclerotic heart disease) 01/31/2024    Atrial flutter (Multi) 05/04/2024    Atrial fibrillation with rapid ventricular response (Multi) 05/04/2024     Resolved Ambulatory Problems     Diagnosis Date Noted    No Resolved Ambulatory Problems     Past Medical History:   Diagnosis Date    Personal history of other diseases of the musculoskeletal system and connective tissue         Review of Systems   All other systems reviewed and are negative.       Objective     There were no vitals filed for this visit.     Vitals and nursing note reviewed.   Constitutional:       Appearance: Healthy appearance.   HENT:    Mouth/Throat:      Pharynx: Oropharynx is clear.   Pulmonary:      Effort: Pulmonary effort is normal.      Breath sounds: Normal breath sounds.   Cardiovascular:      PMI at left midclavicular line. Normal rate. Regular rhythm. Normal S1. Normal S2.       Murmurs: There is a grade 1/6 holosystolic murmur.      No gallop.  No  click. No rub.   Pulses:     Intact distal pulses.   Edema:     Peripheral edema absent.   Abdominal:      General: Bowel sounds are normal.   Musculoskeletal:      Cervical back: Normal range of motion. Skin:     General: Skin is warm and dry.   Neurological:      General: No focal deficit present.      Mental Status: Alert and oriented to person, place and time.            Lab Review:   Admission on 05/04/2024, Discharged on 05/05/2024   Component Date Value    WBC 05/04/2024 8.1     nRBC 05/04/2024 0.0     RBC 05/04/2024 6.73 (H)     Hemoglobin 05/04/2024 19.3 (H)     Hematocrit 05/04/2024 58.0 (H)     MCV 05/04/2024 86     MCH 05/04/2024 28.7     MCHC 05/04/2024 33.3     RDW 05/04/2024 17.7 (H)     Platelets 05/04/2024 163     Neutrophils % 05/04/2024 69.7     Immature Granulocytes %,* 05/04/2024 0.1     Lymphocytes % 05/04/2024 11.8     Monocytes % 05/04/2024 16.2     Eosinophils % 05/04/2024 1.5     Basophils % 05/04/2024 0.7     Neutrophils Absolute 05/04/2024 5.63 (H)     Immature Granulocytes Ab* 05/04/2024 0.01     Lymphocytes Absolute 05/04/2024 0.95     Monocytes Absolute 05/04/2024 1.31 (H)     Eosinophils Absolute 05/04/2024 0.12     Basophils Absolute 05/04/2024 0.06     Glucose 05/04/2024 81     Sodium 05/04/2024 140     Potassium 05/04/2024 5.0     Chloride 05/04/2024 106     Bicarbonate 05/04/2024 22 (L)     Urea Nitrogen 05/04/2024 30 (H)     Creatinine 05/04/2024 1.00     eGFR 05/04/2024 79     Calcium 05/04/2024 10.0     Albumin 05/04/2024 4.4     Alkaline Phosphatase 05/04/2024 64     Total Protein 05/04/2024 7.7     AST 05/04/2024 35     Bilirubin, Total 05/04/2024 0.5     ALT 05/04/2024 43 (H)     Anion Gap 05/04/2024 12     Magnesium 05/04/2024 2.60     Ventricular Rate 05/04/2024 164     Atrial Rate 05/04/2024 328     QRS Duration 05/04/2024 84     QT Interval 05/04/2024 226     QTC Calculation(Bazett) 05/04/2024 373     P Axis 05/04/2024 245     R Axis 05/04/2024 30     T [place] 05/04/2024  57     QRS Count 05/04/2024 27     Q Onset 05/04/2024 220     P Onset 05/04/2024 152     P Offset 05/04/2024 202     T Offset 05/04/2024 333     QTC Fredericia 05/04/2024 316     Ventricular Rate 05/04/2024 83     Atrial Rate 05/04/2024 332     QRS Duration 05/04/2024 98     QT Interval 05/04/2024 344     QTC Calculation(Bazett) 05/04/2024 404     P Axis 05/04/2024 -86     R Axis 05/04/2024 0     T Axis 05/04/2024 21     QRS Count 05/04/2024 13     Q Onset 05/04/2024 223     T Offset 05/04/2024 395     QTC Fredericia 05/04/2024 383     PROBNP 05/04/2024 1,584 (H)     Thyroid Stimulating Horm* 05/04/2024 2.16     Troponin T, High Sensiti* 05/04/2024 14     Protime 05/04/2024 10.9     INR 05/04/2024 1.1     Troponin T, High Sensiti* 05/04/2024 14     Ventricular Rate 05/04/2024 67     Atrial Rate 05/04/2024 67     [place] Interval 05/04/2024 160     QRS Duration 05/04/2024 82     QT Interval 05/04/2024 374     QTC Calculation(Bazett) 05/04/2024 395     P [place] 05/04/2024 64     R [place] 05/04/2024 19     T Axis 05/04/2024 46     QRS Count 05/04/2024 11     Q Onset 05/04/2024 217     P Onset 05/04/2024 137     P Offset 05/04/2024 187     T Offset 05/04/2024 404     QTC Fredericia 05/04/2024 388     Ventricular Rate 05/04/2024 159     Atrial Rate 05/04/2024 318     QRS Duration 05/04/2024 80     QT Interval 05/04/2024 228     QTC Calculation(Bazett) 05/04/2024 370     P Axis 05/04/2024 194     R Axis 05/04/2024 1     T Axis 05/04/2024 76     QRS Count 05/04/2024 26     Q Onset 05/04/2024 220     P Onset 05/04/2024 149     P Offset 05/04/2024 205     T Offset 05/04/2024 334     QTC Fredericia 05/04/2024 315     Troponin T, High Sensiti* 05/04/2024 12     WBC 05/05/2024 7.5     nRBC 05/05/2024 0.0     RBC 05/05/2024 5.75     Hemoglobin 05/05/2024 16.5     Hematocrit 05/05/2024 50.4     MCV 05/05/2024 88     MCH 05/05/2024 28.7     MCHC 05/05/2024 32.7     RDW 05/05/2024 16.1 (H)     Platelets 05/05/2024 118 (L)     Glucose  05/05/2024 96     Sodium 05/05/2024 141     Potassium 05/05/2024 4.2     Chloride 05/05/2024 109 (H)     Bicarbonate 05/05/2024 23 (L)     Urea Nitrogen 05/05/2024 26 (H)     Creatinine 05/05/2024 1.00     eGFR 05/05/2024 79     Calcium 05/05/2024 8.7     Anion Gap 05/05/2024 9    Admission on 05/03/2024, Discharged on 05/03/2024   Component Date Value    WBC 05/03/2024 8.7     nRBC 05/03/2024 0.0     RBC 05/03/2024 6.63 (H)     Hemoglobin 05/03/2024 18.9 (H)     Hematocrit 05/03/2024 56.4 (H)     MCV 05/03/2024 85     MCH 05/03/2024 28.5     MCHC 05/03/2024 33.5     RDW 05/03/2024 17.3 (H)     Platelets 05/03/2024 170     Neutrophils % 05/03/2024 64.1     Immature Granulocytes %,* 05/03/2024 0.2     Lymphocytes % 05/03/2024 17.9     Monocytes % 05/03/2024 14.6     Eosinophils % 05/03/2024 2.4     Basophils % 05/03/2024 0.8     Neutrophils Absolute 05/03/2024 5.56 (H)     Immature Granulocytes Ab* 05/03/2024 0.02     Lymphocytes Absolute 05/03/2024 1.55     Monocytes Absolute 05/03/2024 1.27 (H)     Eosinophils Absolute 05/03/2024 0.21     Basophils Absolute 05/03/2024 0.07     Glucose 05/03/2024 136 (H)     Sodium 05/03/2024 137     Potassium 05/03/2024 4.3     Chloride 05/03/2024 103     Bicarbonate 05/03/2024 20 (L)     Urea Nitrogen 05/03/2024 29 (H)     Creatinine 05/03/2024 1.00     eGFR 05/03/2024 79     Calcium 05/03/2024 10.0     Anion Gap 05/03/2024 14        ECG:  Normal sinus rhythm at 60 bpm [place] interval 160 ms QRS duration 96 ms QTc 400 ms    Assessment/plan:  History as above.  I discussed options including catheter-based therapy including a trial of [place] [place] with pulsed field ablation.  He prefers to do this here with cryoablation..  We will schedule him in the coming weeks.    Problem List Items Addressed This Visit       Atrial fibrillation (Multi) - Primary    Relevant Orders    ECG 12 lead (Clinic Performed)    TSH with reflex to Free T4 if abnormal    T3    Comprehensive metabolic panel     XR chest 2 views    Complete Pulmonary Function Test (Spirometry/DLCO/Lung Volumes)

## 2024-05-30 NOTE — ASSESSMENT & PLAN NOTE
History as above.  I discussed options including catheter-based therapy including a trial of [place] [place] with pulsed field ablation.  He prefers to do this here with cryoablation..  We will schedule him in the coming weeks.

## 2024-05-31 ENCOUNTER — HOSPITAL ENCOUNTER (OUTPATIENT)
Dept: RADIOLOGY | Facility: HOSPITAL | Age: 74
Discharge: HOME | End: 2024-05-31
Payer: MEDICARE

## 2024-05-31 DIAGNOSIS — I48.0 PAROXYSMAL ATRIAL FIBRILLATION (MULTI): ICD-10-CM

## 2024-05-31 LAB — T3 SERPL-MCNC: 112 NG/DL (ref 60–200)

## 2024-05-31 PROCEDURE — 71046 X-RAY EXAM CHEST 2 VIEWS: CPT | Performed by: RADIOLOGY

## 2024-05-31 PROCEDURE — 71046 X-RAY EXAM CHEST 2 VIEWS: CPT

## 2024-05-31 PROCEDURE — 2550000001 HC RX 255 CONTRASTS: Performed by: INTERNAL MEDICINE

## 2024-05-31 PROCEDURE — 71275 CT ANGIOGRAPHY CHEST: CPT

## 2024-05-31 RX ORDER — DIGOXIN 125 MCG
125 TABLET ORAL
Qty: 30 TABLET | Refills: 0 | Status: SHIPPED | OUTPATIENT
Start: 2024-05-31 | End: 2024-07-01 | Stop reason: SDUPTHER

## 2024-05-31 RX ORDER — AMIODARONE HYDROCHLORIDE 200 MG/1
200 TABLET ORAL
Qty: 30 TABLET | Refills: 11 | Status: SHIPPED | OUTPATIENT
Start: 2024-05-31

## 2024-05-31 RX ORDER — DIGOXIN 125 MCG
125 TABLET ORAL
Qty: 30 TABLET | Refills: 11 | Status: ON HOLD | OUTPATIENT
Start: 2024-05-31 | End: 2024-06-03 | Stop reason: SDUPTHER

## 2024-05-31 RX ADMIN — IOHEXOL 75 ML: 350 INJECTION, SOLUTION INTRAVENOUS at 11:38

## 2024-06-03 ENCOUNTER — HOSPITAL ENCOUNTER (OUTPATIENT)
Facility: HOSPITAL | Age: 74
Setting detail: OUTPATIENT SURGERY
Discharge: HOME | End: 2024-06-03
Attending: INTERNAL MEDICINE | Admitting: INTERNAL MEDICINE
Payer: MEDICARE

## 2024-06-03 VITALS
WEIGHT: 207 LBS | TEMPERATURE: 97.9 F | HEART RATE: 54 BPM | RESPIRATION RATE: 16 BRPM | OXYGEN SATURATION: 96 % | SYSTOLIC BLOOD PRESSURE: 116 MMHG | DIASTOLIC BLOOD PRESSURE: 65 MMHG | BODY MASS INDEX: 28.98 KG/M2 | HEIGHT: 71 IN

## 2024-06-03 DIAGNOSIS — I48.0 PAROXYSMAL ATRIAL FIBRILLATION (MULTI): ICD-10-CM

## 2024-06-03 LAB — ACT BLD: 281 SEC (ref 89–169)

## 2024-06-03 PROCEDURE — 7100000010 HC PHASE TWO TIME - EACH INCREMENTAL 1 MINUTE: Performed by: INTERNAL MEDICINE

## 2024-06-03 PROCEDURE — C1769 GUIDE WIRE: HCPCS | Performed by: INTERNAL MEDICINE

## 2024-06-03 PROCEDURE — 2550000001 HC RX 255 CONTRASTS: Performed by: INTERNAL MEDICINE

## 2024-06-03 PROCEDURE — C1730 CATH, EP, 19 OR FEW ELECT: HCPCS | Performed by: INTERNAL MEDICINE

## 2024-06-03 PROCEDURE — 2720000007 HC OR 272 NO HCPCS: Performed by: INTERNAL MEDICINE

## 2024-06-03 PROCEDURE — C1760 CLOSURE DEV, VASC: HCPCS | Performed by: INTERNAL MEDICINE

## 2024-06-03 PROCEDURE — 2500000005 HC RX 250 GENERAL PHARMACY W/O HCPCS: Performed by: INTERNAL MEDICINE

## 2024-06-03 PROCEDURE — 7100000009 HC PHASE TWO TIME - INITIAL BASE CHARGE: Performed by: INTERNAL MEDICINE

## 2024-06-03 PROCEDURE — 99153 MOD SED SAME PHYS/QHP EA: CPT | Performed by: INTERNAL MEDICINE

## 2024-06-03 PROCEDURE — 2500000004 HC RX 250 GENERAL PHARMACY W/ HCPCS (ALT 636 FOR OP/ED): Performed by: INTERNAL MEDICINE

## 2024-06-03 PROCEDURE — 2780000003 HC OR 278 NO HCPCS: Performed by: INTERNAL MEDICINE

## 2024-06-03 PROCEDURE — 85347 COAGULATION TIME ACTIVATED: CPT

## 2024-06-03 PROCEDURE — 99152 MOD SED SAME PHYS/QHP 5/>YRS: CPT | Performed by: INTERNAL MEDICINE

## 2024-06-03 PROCEDURE — C1766 INTRO/SHEATH,STRBLE,NON-PEEL: HCPCS | Performed by: INTERNAL MEDICINE

## 2024-06-03 PROCEDURE — 93656 COMPRE EP EVAL ABLTJ ATR FIB: CPT | Performed by: INTERNAL MEDICINE

## 2024-06-03 PROCEDURE — C1759 CATH, INTRA ECHOCARDIOGRAPHY: HCPCS | Performed by: INTERNAL MEDICINE

## 2024-06-03 PROCEDURE — C1893 INTRO/SHEATH, FIXED,NON-PEEL: HCPCS | Performed by: INTERNAL MEDICINE

## 2024-06-03 PROCEDURE — C1733 CATH, EP, OTHR THAN COOL-TIP: HCPCS | Performed by: INTERNAL MEDICINE

## 2024-06-03 RX ORDER — BUPIVACAINE HYDROCHLORIDE 2.5 MG/ML
INJECTION, SOLUTION EPIDURAL; INFILTRATION; INTRACAUDAL AS NEEDED
Status: DISCONTINUED | OUTPATIENT
Start: 2024-06-03 | End: 2024-06-03 | Stop reason: HOSPADM

## 2024-06-03 RX ORDER — MIDAZOLAM HYDROCHLORIDE 1 MG/ML
INJECTION, SOLUTION INTRAMUSCULAR; INTRAVENOUS AS NEEDED
Status: DISCONTINUED | OUTPATIENT
Start: 2024-06-03 | End: 2024-06-03 | Stop reason: HOSPADM

## 2024-06-03 RX ORDER — IODIXANOL 320 MG/ML
INJECTION, SOLUTION INTRAVASCULAR AS NEEDED
Status: DISCONTINUED | OUTPATIENT
Start: 2024-06-03 | End: 2024-06-03 | Stop reason: HOSPADM

## 2024-06-03 RX ORDER — HEPARIN SODIUM 1000 [USP'U]/ML
INJECTION, SOLUTION INTRAVENOUS; SUBCUTANEOUS AS NEEDED
Status: DISCONTINUED | OUTPATIENT
Start: 2024-06-03 | End: 2024-06-03 | Stop reason: HOSPADM

## 2024-06-03 RX ORDER — HEPARIN SODIUM 10000 [USP'U]/100ML
INJECTION, SOLUTION INTRAVENOUS CONTINUOUS PRN
Status: DISCONTINUED | OUTPATIENT
Start: 2024-06-03 | End: 2024-06-03 | Stop reason: HOSPADM

## 2024-06-03 RX ORDER — FENTANYL CITRATE 50 UG/ML
INJECTION, SOLUTION INTRAMUSCULAR; INTRAVENOUS AS NEEDED
Status: DISCONTINUED | OUTPATIENT
Start: 2024-06-03 | End: 2024-06-03 | Stop reason: HOSPADM

## 2024-06-03 ASSESSMENT — PAIN SCALES - GENERAL
PAINLEVEL_OUTOF10: 0 - NO PAIN
PAINLEVEL_OUTOF10: 0 - NO PAIN

## 2024-06-03 ASSESSMENT — PAIN - FUNCTIONAL ASSESSMENT
PAIN_FUNCTIONAL_ASSESSMENT: 0-10
PAIN_FUNCTIONAL_ASSESSMENT: 0-10

## 2024-06-03 ASSESSMENT — COLUMBIA-SUICIDE SEVERITY RATING SCALE - C-SSRS
2. HAVE YOU ACTUALLY HAD ANY THOUGHTS OF KILLING YOURSELF?: NO
6. HAVE YOU EVER DONE ANYTHING, STARTED TO DO ANYTHING, OR PREPARED TO DO ANYTHING TO END YOUR LIFE?: NO
1. IN THE PAST MONTH, HAVE YOU WISHED YOU WERE DEAD OR WISHED YOU COULD GO TO SLEEP AND NOT WAKE UP?: NO

## 2024-06-03 NOTE — DISCHARGE INSTRUCTIONS
FOR NEXT 24 HOURS  - Upon discharge, you should return home and rest for the remainder of the day and evening. You do not have to stay on bed rest but should not be very active.  It is recommended a responsible adult be with you for the first 24 hours after the procedure.    -Please resume your blood thinning medication the evening of your procedure.      - No driving for 24 hours after procedure.     - Do not drive, operate machinery, or use power tools for 24 hours after your procedure.     - Do not make any legal decisions for 24 hours after your procedure.     - Do not drink alcoholic beverages for 24 hours after your procedure.     WOUND CARE    ·      Avoid heavy lifting (over 10 pounds) for 7 days, squatting or excessive bending for 2 days, and strenuous exercise for 7 days.  ·      No submerged bathing, swimming, or hot tubs for the next 3 days, or until fully healed.  ·      Avoid sexual activity for 3-4 days until any groin discomfort has ceased.  - The transparent dressing should be removed from the site 24 hours after the procedure.  Wash the site gently with soap and water. Rinse well and pat dry. Keep the area clean and dry. You may apply a Band-Aid to the site. Avoid lotions, ointments, or powders until fully healed.     - You may shower the day after your procedure.      - It is normal to notice a small bruise around the puncture site and/or a small grape sized or smaller lump. Any large bruising or large lump warrants a call to the office.      - If bleeding should occur, lay down and apply pressure to the affected area for 10 minutes.  If the bleeding stops notify your physician.  If there is a large amount of bleeding or spurting of blood CALL 911 immediately.  DO NOT drive yourself to the hospital.     - You may experience some tenderness, bruising or minimal inflammation.  If you have any concerns, you may contact the Cath Lab or if any of these symptoms become excessive, contact your  cardiologist or go to the emergency room.      OTHER INSTRUCTIONS  - You may take acetaminophen (Tylenol) as directed for discomfort.  If pain is not relieved with acetaminophen (Tylenol), contact your doctor.     - If you notice or experience any of the following, you should notify your doctor or seek medical attention  · Chest pain or discomfort  · Change in mental status or weakness in extremities.  · Dizziness, light headedness, or feeling faint.  · Change in the site where the procedure was performed, such as bleeding or an increased area of bruising or swelling.  · Tingling, numbness, pain, or coolness in the leg/arm beyond the site where the procedure was performed.  · Signs of infection (i.e. shaking chills, temperature > 100 degrees Fahrenheit, warmth, redness) in the leg/arm area where the procedure was performed.  · Changes in urination   · Bloody or black stools  · Vomiting blood  · Severe nose bleeds  · Any excessive bleeding

## 2024-06-03 NOTE — POST-PROCEDURE NOTE
Physician Transition of Care Summary  Invasive Cardiovascular Lab    Procedure Date: 6/3/2024  Attending:    * Jatinder Schwarz - Primary  Resident/Fellow/Other Assistant: Surgeons and Role:  * No surgeons found with a matching role *    Indications:   Pre-op Diagnosis     * Paroxysmal atrial fibrillation (Multi) [I48.0]    Post-procedure diagnosis:   Post-op Diagnosis     * Paroxysmal atrial fibrillation (Multi) [I48.0]    Procedure(s):   Ablation A-Fib  05527 - [place] COMPRE EP EVAL ABLTJ ATR FIB PULM VEIN ISOLATION        Procedure Findings:   See below    Description of the Procedure:   After written witnessed informed consent was obtained the procedure from the patient, the patient was transferred to the EP laboratory. The patient was in the fasting state. A grounding pad was placed. The patient was set up for monitoring of surface ECG and intracardiac electrograms. 3-D mapping patches were placed. The right and left groins were prepped and draped in the usual sterile fashion. Bupivacaine was administered locally for anesthetic. Right and Left  Femoral vein access was obtained. The vessel was accessed using the modified Seldinger technique.     The catheters were positioned as follows:  Right Femoral Vein - 6 Fr EPXT octopolar catheter for phrenic nerve pacing  Left Femoral Vein - 6 Fr coronary sinus EPXT octopolar catheter  Right Femoral Vein - 6 Fr SLO long sheath through a short 12 Fr sheath  Left Femoral Vein - 9 Fr SJM Intracardiac ECHO View Flex catheter       An inquiry catheter was advanced to the superior vena cava for phrenic nerve pacing during right vein ablation. Additionally a separate ECG lead was placed over the phrenic nerve region for assessment of electrocardiographic evidence of phrenic nerve capture. Phrenic nerve pacing was performed during both the right superior vein and right inferior vein ablation. Electrocardiographic monitoring and manual palpation was performed throughout the right  sided vein lesions. There was no loss of phrenic nerve capture during ablation.  Trans septal catheterization was performed under biplane fluoroscopic and intracardiac echocardiographic guidance. Heparin bolus and continuous IV infusion were given immediately following trans septal puncture. An ACT was maintained around 300 throughout the procedure with assessments between 20 and 30 minutes apart throughout the procedure.  The SL 0 long sheath was exchanged over a wire for the Medtronic Flex catheter long sheath. A 28 mm Medtronic cryoablation arctic front advance balloon system was then advanced to the left atrium. An intra luminary achieve circular catheter was advanced through the balloon catheter. A three dimensional map was recreated of the left atrium and superimposed on our CT scan of the left atrium obtained prior to procedure. The 2 maps were performed utilizing the PacerPro mapping system. All 4 veins were cannulated with the  catheter and we performed at least 2 lesions for each vein confirming appropriate seal with contrast injection and cinematography. Duration lesion is very between 3 and 4 minutes per lesion. We achieved temperatures between -40 and -60°C for each lesion. Pulmonary vein signals were assessed utilizing the achieve catheter to ensure complete pulmonary vein isolation. AdEPS showed normal intracardiac intervals and no other inducible arrhythmias. There is no evidence of dual AV pina physiology.     Vascade closure device was utilized for all four venous access sites      Summary  Successful pulmonary vein isolation with Cryoablation.     Complications:   none    Stents/Implants:   Implants       No implant documentation for this case.            Anticoagulation/Antiplatelet Plan:   heparin    Estimated Blood Loss:   20 mL    Anesthesia: Moderate Sedation Anesthesia Staff: No anesthesia staff entered.    Any Specimen(s) Removed:   No specimens collected during this  procedure.    Disposition:   stable      Electronically signed by: Jatinder Schwarz MD, 6/3/2024 1:09 PM

## 2024-06-04 ASSESSMENT — PAIN SCALES - GENERAL: PAINLEVEL_OUTOF10: 0 - NO PAIN

## 2024-06-12 ENCOUNTER — HOSPITAL ENCOUNTER (EMERGENCY)
Facility: HOSPITAL | Age: 74
Discharge: HOME | End: 2024-06-12
Payer: MEDICARE

## 2024-06-12 VITALS
WEIGHT: 207 LBS | HEART RATE: 56 BPM | HEIGHT: 71 IN | BODY MASS INDEX: 28.98 KG/M2 | DIASTOLIC BLOOD PRESSURE: 76 MMHG | SYSTOLIC BLOOD PRESSURE: 147 MMHG | OXYGEN SATURATION: 99 % | RESPIRATION RATE: 18 BRPM | TEMPERATURE: 97.2 F

## 2024-06-12 DIAGNOSIS — L90.5 SCAR TISSUE: Primary | ICD-10-CM

## 2024-06-12 PROCEDURE — 99281 EMR DPT VST MAYX REQ PHY/QHP: CPT | Performed by: NURSE PRACTITIONER

## 2024-06-12 ASSESSMENT — COLUMBIA-SUICIDE SEVERITY RATING SCALE - C-SSRS
1. IN THE PAST MONTH, HAVE YOU WISHED YOU WERE DEAD OR WISHED YOU COULD GO TO SLEEP AND NOT WAKE UP?: NO
2. HAVE YOU ACTUALLY HAD ANY THOUGHTS OF KILLING YOURSELF?: NO
6. HAVE YOU EVER DONE ANYTHING, STARTED TO DO ANYTHING, OR PREPARED TO DO ANYTHING TO END YOUR LIFE?: NO

## 2024-06-12 ASSESSMENT — PAIN SCALES - GENERAL
PAINLEVEL_OUTOF10: 1
PAINLEVEL_OUTOF10: 1

## 2024-06-12 ASSESSMENT — PAIN - FUNCTIONAL ASSESSMENT: PAIN_FUNCTIONAL_ASSESSMENT: 0-10

## 2024-06-12 NOTE — ED TRIAGE NOTES
Pt is on eliquis. Had an ablation last Monday and was told to watch for lumps. Said he has a pea sized, and one a little bigger in the groin where they went in

## 2024-06-12 NOTE — ED PROVIDER NOTES
HPI   Chief Complaint   Patient presents with    Lump on Groin       HPI  See my MDM                  No data recorded                   Patient History   Past Medical History:   Diagnosis Date    Atrial fibrillation (Multi)     Personal history of other diseases of the musculoskeletal system and connective tissue     History of low back pain     Past Surgical History:   Procedure Laterality Date    CARDIAC ELECTROPHYSIOLOGY PROCEDURE N/A 6/3/2024    Procedure: Ablation A-Fib;  Surgeon: Jatinder Schwarz MD;  Location: [facility] Cardiac Cath Lab;  Service: Electrophysiology;  Laterality: N/A;  Afib cryoablation; CPT 72572, ICD I48.91 (no pre aoth needed)    TONSILLECTOMY  08/12/2014    Tonsillectomy     Family History   Problem Relation Name Age of Onset    Heart disease Mother      Cancer Mother      Heart disease Father       Social History     Tobacco Use    Smoking status: Never    Smokeless tobacco: Never   Substance Use Topics    Alcohol use: Never    Drug use: Never       Physical Exam   ED Triage Vitals [06/12/24 1144]   Temperature Heart Rate Resp BP   36.2 °C (97.2 °F) 56 -- 147/76      Pulse Ox Temp Source Heart Rate Source Patient Position   99 % Tympanic Monitor Sitting      BP Location FiO2 (%)     Left arm --       Physical Exam  CONSTITUTIONAL: Vital signs reviewed as charted, well-developed and in no distress  LUNGS: Breath sounds equal and clear to auscultation. Good air exchange, no wheezes rales or retractions, pulse oximetry is charted.  HEART: Regular rate and rhythm without murmur thrill or rub, strong tones, auscultation is normal.  ABDOMEN: Soft and nontender without guarding rebound rigidity or mass. Bowel sounds are present and normal in all quadrants. There is no palpable masses or aneurysms identified. No hepatosplenomegaly, normal abdominal exam.  Neuro: The patient is awake, alert and oriented ×3. Moving all 4 extremities and answering questions appropriately.   MUSCULOSKELETAL: The calves  are nontender to palpation. Full gross active range of motion.   PSYCH: Awake alert oriented, normal mood and affect.  Skin:  Dry, normal color, warm to the touch, no rash present.  Examination of both groins whether or puncture wounds show some scar tissue underlying the puncture themselves.  Small marble shaped hard tissue.  It is movable, no fluctuance no redness no warmth    ED Course & MDM   Diagnoses as of 06/12/24 1205   Scar tissue       Medical Decision Making  History obtained from: patient    Vital signs, nursing notes, current medications, past medical history, Surgical history, allergies, social history, family History were reviewed.         HPI:  Patient 73-year-old male present emergency room today for evaluation of lumps in both of his groins status post catheterization.  This happened 10 days ago.  No fever chills or night sweats.  No nausea vomiting diarrhea.  States is having hard time getting anybody to look at the so he came here to the emergency room.  Is on Eliquis does have a lot of erythema.      10 point ROS was reviewed and negative except Noted above in HPI.  DDX: as listed above          MDM Summary/considerations:    Labs Reviewed - No data to display  No orders to display     Medications - No data to display  New Prescriptions    No medications on file       I estimate there is LOW risk for CELLULITIS, COMPARTMENT SYNDROME, NECROTIZING FASCIITIS, TENDON OR NEUROVASCULAR INJURY, or FOREIGN BODY, thus I consider the discharge disposition reasonable. Also, there is no evidence for peritonitis, sepsis, or toxicity. We have discussed the diagnosis and risks, and we agree with discharging home to follow-up with their primary doctor. We also discussed returning to the Emergency Department immediately if new or worsening symptoms occur. We have discussed the symptoms which are most concerning (e.g., changing or worsening pain, fever, numbness, weakness, cool or painful digits) that necessitate  immediate return.     Patient's concerns are consistent with scar tissue underlying the surgical incision for the cardiac catheterization.  There is no evidence of fluctuance or any other abnormality at this time.  Patient was discharged home stable condition    All of the patient's questions were answered to the best of my ability.  Patient states understanding that they have been screened for an emergency today and we have not found any etiology of symptoms that requires emergent treatment or admission to the hospital at this point. They understand that they have not had definitive care day and require follow-up for treatment of their condition. They also state understanding that they may have an emergent condition that may potentially have not of detected at this visit and they must return to the emergency department if they develop any worsening of symptoms or new complaints.      I have evaluated this patient, my supervising physician was available for consultation.                Critical Care: Not warranted at this time        This chart was completed using voice recognition transcription software. Please excuse any errors of transcription including grammatical, punctuation, syntax and spelling errors.  Please contact me with any questions regarding this chart.    Procedure  Procedures     LENA Casper-CNP  06/12/24 3882

## 2024-06-17 ENCOUNTER — HOSPITAL ENCOUNTER (OUTPATIENT)
Dept: RESPIRATORY THERAPY | Facility: CLINIC | Age: 74
Discharge: HOME | End: 2024-06-17
Payer: MEDICARE

## 2024-06-17 DIAGNOSIS — I48.0 PAROXYSMAL ATRIAL FIBRILLATION (MULTI): ICD-10-CM

## 2024-06-17 PROCEDURE — 94729 DIFFUSING CAPACITY: CPT

## 2024-06-17 PROCEDURE — 94060 EVALUATION OF WHEEZING: CPT

## 2024-06-17 PROCEDURE — 98960 EDU&TRN PT SELF-MGMT NQHP 1: CPT | Mod: MUE

## 2024-06-17 PROCEDURE — 94760 N-INVAS EAR/PLS OXIMETRY 1: CPT

## 2024-06-17 PROCEDURE — 94664 DEMO&/EVAL PT USE INHALER: CPT | Mod: 59

## 2024-07-01 DIAGNOSIS — I48.0 PAROXYSMAL ATRIAL FIBRILLATION (MULTI): ICD-10-CM

## 2024-07-01 RX ORDER — DIGOXIN 125 MCG
125 TABLET ORAL
Qty: 30 TABLET | Refills: 0 | Status: SHIPPED | OUTPATIENT
Start: 2024-07-01

## 2024-07-09 ENCOUNTER — OFFICE VISIT (OUTPATIENT)
Dept: CARDIOLOGY | Facility: CLINIC | Age: 74
End: 2024-07-09
Payer: MEDICARE

## 2024-07-09 VITALS — DIASTOLIC BLOOD PRESSURE: 76 MMHG | BODY MASS INDEX: 28.87 KG/M2 | WEIGHT: 207 LBS | SYSTOLIC BLOOD PRESSURE: 122 MMHG

## 2024-07-09 DIAGNOSIS — I48.0 PAROXYSMAL ATRIAL FIBRILLATION (MULTI): Primary | ICD-10-CM

## 2024-07-09 PROBLEM — I48.91 ATRIAL FIBRILLATION WITH RAPID VENTRICULAR RESPONSE (MULTI): Status: RESOLVED | Noted: 2024-05-04 | Resolved: 2024-07-09

## 2024-07-09 PROCEDURE — 99213 OFFICE O/P EST LOW 20 MIN: CPT | Performed by: INTERNAL MEDICINE

## 2024-07-09 PROCEDURE — 93005 ELECTROCARDIOGRAM TRACING: CPT | Performed by: INTERNAL MEDICINE

## 2024-07-09 PROCEDURE — 1159F MED LIST DOCD IN RCRD: CPT | Performed by: INTERNAL MEDICINE

## 2024-07-09 PROCEDURE — 1036F TOBACCO NON-USER: CPT | Performed by: INTERNAL MEDICINE

## 2024-07-09 PROCEDURE — 93010 ELECTROCARDIOGRAM REPORT: CPT | Performed by: INTERNAL MEDICINE

## 2024-07-09 PROCEDURE — 1126F AMNT PAIN NOTED NONE PRSNT: CPT | Performed by: INTERNAL MEDICINE

## 2024-07-09 ASSESSMENT — PATIENT HEALTH QUESTIONNAIRE - PHQ9
2. FEELING DOWN, DEPRESSED OR HOPELESS: NOT AT ALL
SUM OF ALL RESPONSES TO PHQ9 QUESTIONS 1 AND 2: 0
1. LITTLE INTEREST OR PLEASURE IN DOING THINGS: NOT AT ALL

## 2024-07-09 ASSESSMENT — PAIN SCALES - GENERAL: PAINLEVEL: 0-NO PAIN

## 2024-07-09 ASSESSMENT — ENCOUNTER SYMPTOMS
LOSS OF SENSATION IN FEET: 0
OCCASIONAL FEELINGS OF UNSTEADINESS: 0
DEPRESSION: 0

## 2024-07-09 NOTE — PROGRESS NOTES
Chief Complaint   Patient presents with    Follow-up            The patient is a 73-year-old male with a history of symptomatic drug refractory paroxysmal atrial fibrillation post catheter-based therapy with pulmonary vein isolation utilizing cryoablation about 6 weeks ago.  He is here for follow-up after his procedure.  He is maintained on amiodarone as well transiently.  He is doing quite well with no recurrences whatsoever.  He did have some pulmonary issues initially but that almost completely resolved fairly quickly         Active Ambulatory Problems     Diagnosis Date Noted    Atrial fibrillation (Multi) 09/18/2023    Claudication (CMS-HCC) 09/18/2023    Electrocardiogram abnormal 09/18/2023    Hyperlipidemia 09/18/2023    Obstructive sleep apnea (adult) (pediatric) 09/18/2023    Palpitations 09/18/2023    ASHD (arteriosclerotic heart disease) 01/31/2024    Atrial flutter (Multi) 05/04/2024    Atrial fibrillation with rapid ventricular response (Multi) 05/04/2024     Resolved Ambulatory Problems     Diagnosis Date Noted    No Resolved Ambulatory Problems     Past Medical History:   Diagnosis Date    Personal history of other diseases of the musculoskeletal system and connective tissue         Review of Systems   All other systems reviewed and are negative.       Objective     Vitals:    07/09/24 0908   BP: 122/76        Vitals and nursing note reviewed.   Constitutional:       Appearance: Healthy appearance.   HENT:    Mouth/Throat:      Pharynx: Oropharynx is clear.   Pulmonary:      Effort: Pulmonary effort is normal.      Breath sounds: Normal breath sounds.   Cardiovascular:      PMI at left midclavicular line. Normal rate. Regular rhythm. Normal S1. Normal S2.       Murmurs: There is a grade 1/6 holosystolic murmur.      No gallop.  No click. No rub.   Pulses:     Intact distal pulses.   Edema:     Peripheral edema absent.   Abdominal:      General: Bowel sounds are normal.   Musculoskeletal:       Cervical back: Normal range of motion. Skin:     General: Skin is warm and dry.   Neurological:      General: No focal deficit present.      Mental Status: Alert and oriented to person, place and time.            Lab Review:   Admission on 06/03/2024, Discharged on 06/03/2024   Component Date Value    POCT Activated Clotting * 06/03/2024 281 (H)    Lab on 05/30/2024   Component Date Value    Thyroid Stimulating Horm* 05/30/2024 1.97     Triiodothyronine 05/30/2024 112     Glucose 05/30/2024 125 (H)     Sodium 05/30/2024 135 (L)     Potassium 05/30/2024 4.5     Chloride 05/30/2024 103     Bicarbonate 05/30/2024 22     Anion Gap 05/30/2024 15     Urea Nitrogen 05/30/2024 25 (H)     Creatinine 05/30/2024 1.18     eGFR 05/30/2024 65     Calcium 05/30/2024 9.8     Albumin 05/30/2024 4.3     Alkaline Phosphatase 05/30/2024 53     Total Protein 05/30/2024 7.4     AST 05/30/2024 33     Bilirubin, Total 05/30/2024 0.6     ALT 05/30/2024 40     WBC 05/30/2024 7.7     nRBC 05/30/2024 0.0     RBC 05/30/2024 6.03 (H)     Hemoglobin 05/30/2024 17.4     Hematocrit 05/30/2024 54.2 (H)     MCV 05/30/2024 90     MCH 05/30/2024 28.9     MCHC 05/30/2024 32.1     RDW 05/30/2024 15.9 (H)     Platelets 05/30/2024 161        ECG:  Sinus bradycardia at 58 bpm AL interval 174 ms QRS duration 90 ms QTc 390 ms    Assessment/plan:  History as above.  I would discontinue his digoxin at this juncture and over the next couple of months decrease his amiodarone to 100 mg a day with hopes of discontinuing that medication as well in the future.    Problem List Items Addressed This Visit       Atrial fibrillation (Multi) - Primary    Relevant Orders    ECG 12 lead (Clinic Performed)    Comprehensive metabolic panel    TSH with reflex to Free T4 if abnormal    T3    XR chest 2 views    Complete Pulmonary Function Test (Spirometry/DLCO/Lung Volumes)

## 2024-07-09 NOTE — ASSESSMENT & PLAN NOTE
History as above.  I would discontinue his digoxin at this juncture and over the next couple of months decrease his amiodarone to 100 mg a day with hopes of discontinuing that medication as well in the future.

## 2024-07-24 ENCOUNTER — APPOINTMENT (OUTPATIENT)
Dept: INTEGRATIVE MEDICINE | Facility: CLINIC | Age: 74
End: 2024-07-24
Payer: MEDICARE

## 2024-07-25 ASSESSMENT — ENCOUNTER SYMPTOMS
NAUSEA: 0
AGITATION: 0
DYSURIA: 0
DIARRHEA: 0
SHORTNESS OF BREATH: 0
DIZZINESS: 0
COLOR CHANGE: 0
FEVER: 0
VOMITING: 0
DIFFICULTY URINATING: 0

## 2024-07-25 NOTE — PROGRESS NOTES
Assessment/Plan   Patient's low back pain with radiation to the right lower extremity is most consistent with lumbar stenosis caused by multilevel disc bulging and degenerative changes and grade 1 L5 degenerative spondylolisthesis as evident on his most recent lumbar spine MRI. He is mostly neurologically intact. Treatment will involve soft tissue and spinal manipulation as well as dry needling for the lumbar erectors and right gluteus medius. Also advised the patient to do flexion-based stretches multiple times per day including lying supine and hugging his knee or knees to his chest and also ride his exercise bike or go in the pool for exercise but only to exercise to tolerance and not push through any lower extremity pain. We can refer him for pain management or obtain updated imaging if needed pending a trial of care. Advised he may continue receiving spinal traction at an outside clinic since it has been helpful in the past. Requesting previous imaging from [facility] be sent digitally.      Visits this year: 2    Subjective   Patient reports that he is dealing with 3 out of 10 frequent lower back pain with intermittent radiation into the right lower extremity posteriorly which is exacerbated by prolonged standing and walking.  He notes that after our last visit his lower back pains subsided for a while and was doing much better however he attributes that to lack of activity.  Dealt with a couple episodes of a flare of atrial fibrillation and went to the emergency department for this subsequently having ablation procedure on the heart and that is gone very well.  He is under follow-up with cardiology but in general feels better and his heart rate is stabilizing in the 60s and beats per minute.  Notes that he has been doing gardening recently and has been very physically active feels this may be contributing somewhat to the lower back pain but denies any specific injury    HPI - LBP w/ radiation (R) 06/14/2023 -  Patient reports chronic low back pain with radiation to the right gluteal region and leg. Notes that he has had low back pain since around 2010 or 2000. Symptoms came on insidiously. Since around 2017 or 2018 he has developed radiating pain into the right lower extremity. Has tried a few different chiropractors for treatment. Initially went to a chiropractor that is helping him a lot however the chiropractor unfortunately passed away and that he saw a new chiropractor retired. Recently saw a different chiropractor and is getting some relief with mechanical traction therapy. Notes that he occasionally will get weakness in the right lower extremity. Also dealing with bilateral knee pain and has been advised that he needs right total knee replacement. Symptoms are worse in the lower back and lower extremity with standing walking or after being in the car for a long drive and typically alleviated by sitting in a recliner. Also dealing with atrial fibrillation. In general has been less active since the back pain is worsened over the past 5 years and also because of the atrial fibrillation. Would like to be more active and be able to do more gardening and walking as currently can only walk for a few minutes depending on how severe symptoms are before pain will flare.     Review of Systems   Constitutional:  Negative for fever.   Eyes:  Negative for visual disturbance.   Respiratory:  Negative for shortness of breath.    Cardiovascular:  Negative for chest pain.   Gastrointestinal:  Negative for diarrhea, nausea and vomiting.   Genitourinary:  Negative for difficulty urinating and dysuria.   Skin:  Negative for color change.   Neurological:  Negative for dizziness.   Psychiatric/Behavioral:  Negative for agitation.    All other systems reviewed and are negative.    Objective   Examination findings (e.g., palpation & ROM): Decreased L/S ROM with pain, hypertonic and tender lumbar erectors & QL (L>R)  BL SLR 60, tightness on  R  Relief in knee-chest position    Segmental joint dysfunction was identified in the following areas using motion palpation and/or pain provocation assessment:  Cervical: 7  Thoracic: 5-8, 12  Lumbopelvic:  1,2, BL SIJ    Physical Exam  Neurological:      Mental Status: He is alert.      Sensory: Sensation is intact.      Motor: Motor function is intact.      Coordination: Coordination is intact.      Gait: Gait is intact.      Deep Tendon Reflexes:      Reflex Scores:       Patellar reflexes are 2+ on the right side and 2+ on the left side.       Achilles reflexes are 1+ on the right side and 2+ on the left side.     Comments: 7/26/24         Plan   Today's treatment:  SMT to regions of segmental dysfunction identified on exam, using age-appropriate force, and manual diversified technique. Mobilization used on CS  STM to patient tolerance to hypertonic paraspinal muscles in LS  Manual dynamic stretching of the gluteal muscles, hamstrings, upper trapezius 5m  Patient noted reduced pain and improved mobility post-treatment    Treatment Plan:   The patient and I discussed the risks and benefits of chiropractic care. Based on the patient's subjective complaints along with the examination findings, it is advised that a course of chiropractic treatment by initiated. Consent for care was given both written and orally by the patient. The patient tolerated today's treatment with little or no additional discomfort and was instructed to contact the office for questions or concerns. Will see patient once per week then every 2 weeks when symptoms become mild/manageable, further spaced apart contingent upon improvement.     This chart note was generated using dictation software, and as such, there may be typographical errors present. Abbreviations: Cervical spine (CS), Dry needling (DN), Flexion adduction internal rotation (FAIR), high velocity, low amplitude (HVLA), Lumbar spine (LS), Soft tissue manipulation (STM), spinal  manipulative therapy (SMT), Straight leg raise (SLR), Thoracic spine (TS).

## 2024-07-26 ENCOUNTER — APPOINTMENT (OUTPATIENT)
Dept: INTEGRATIVE MEDICINE | Facility: CLINIC | Age: 74
End: 2024-07-26
Payer: MEDICARE

## 2024-07-26 DIAGNOSIS — M48.062 LUMBAR STENOSIS WITH NEUROGENIC CLAUDICATION: ICD-10-CM

## 2024-07-26 DIAGNOSIS — I48.0 PAROXYSMAL ATRIAL FIBRILLATION (MULTI): Primary | ICD-10-CM

## 2024-07-26 DIAGNOSIS — M99.02 SOMATIC DYSFUNCTION OF THORACIC REGION: Primary | ICD-10-CM

## 2024-07-26 DIAGNOSIS — M99.03 SOMATIC DYSFUNCTION OF LUMBAR REGION: ICD-10-CM

## 2024-07-26 DIAGNOSIS — M99.05 SOMATIC DYSFUNCTION OF PELVIS REGION: ICD-10-CM

## 2024-07-26 PROCEDURE — 98941 CHIROPRACT MANJ 3-4 REGIONS: CPT | Performed by: CHIROPRACTOR

## 2024-07-29 ENCOUNTER — PHARMACY VISIT (OUTPATIENT)
Dept: PHARMACY | Facility: CLINIC | Age: 74
End: 2024-07-29
Payer: COMMERCIAL

## 2024-07-29 PROCEDURE — RXMED WILLOW AMBULATORY MEDICATION CHARGE

## 2024-08-27 DIAGNOSIS — E78.2 MIXED HYPERLIPIDEMIA: Primary | ICD-10-CM

## 2024-08-27 RX ORDER — ATORVASTATIN CALCIUM 20 MG/1
20 TABLET, FILM COATED ORAL DAILY
Qty: 90 TABLET | Refills: 3 | Status: SHIPPED | OUTPATIENT
Start: 2024-08-27

## 2024-09-17 ENCOUNTER — APPOINTMENT (OUTPATIENT)
Dept: CARDIOLOGY | Facility: CLINIC | Age: 74
End: 2024-09-17
Payer: MEDICARE

## 2024-09-25 ENCOUNTER — APPOINTMENT (OUTPATIENT)
Age: 74
End: 2024-09-25
Payer: MEDICARE

## 2024-09-25 VITALS
OXYGEN SATURATION: 97 % | WEIGHT: 206 LBS | HEART RATE: 64 BPM | SYSTOLIC BLOOD PRESSURE: 128 MMHG | BODY MASS INDEX: 28.73 KG/M2 | DIASTOLIC BLOOD PRESSURE: 62 MMHG

## 2024-09-25 DIAGNOSIS — I48.91 ATRIAL FIBRILLATION, UNSPECIFIED TYPE (MULTI): ICD-10-CM

## 2024-09-25 DIAGNOSIS — I48.0 PAROXYSMAL ATRIAL FIBRILLATION (MULTI): ICD-10-CM

## 2024-09-25 DIAGNOSIS — I25.10 ASHD (ARTERIOSCLEROTIC HEART DISEASE): Primary | ICD-10-CM

## 2024-09-25 PROCEDURE — 99213 OFFICE O/P EST LOW 20 MIN: CPT | Performed by: INTERNAL MEDICINE

## 2024-09-25 PROCEDURE — 1036F TOBACCO NON-USER: CPT | Performed by: INTERNAL MEDICINE

## 2024-09-25 PROCEDURE — 1126F AMNT PAIN NOTED NONE PRSNT: CPT | Performed by: INTERNAL MEDICINE

## 2024-09-25 PROCEDURE — 1159F MED LIST DOCD IN RCRD: CPT | Performed by: INTERNAL MEDICINE

## 2024-09-25 PROCEDURE — 93000 ELECTROCARDIOGRAM COMPLETE: CPT | Performed by: INTERNAL MEDICINE

## 2024-09-25 RX ORDER — METOPROLOL TARTRATE 25 MG/1
25 TABLET, FILM COATED ORAL 2 TIMES DAILY
Qty: 270 TABLET | Refills: 3 | Status: SHIPPED | OUTPATIENT
Start: 2024-09-25

## 2024-09-25 ASSESSMENT — ENCOUNTER SYMPTOMS
LOSS OF SENSATION IN FEET: 0
DYSPNEA ON EXERTION: 0
WEIGHT GAIN: 0
CLAUDICATION: 0
DIAPHORESIS: 0
DEPRESSION: 0
MYALGIAS: 0
DIZZINESS: 0
WHEEZING: 0
NEAR-SYNCOPE: 0
SYNCOPE: 0
COUGH: 0
ORTHOPNEA: 0
WEIGHT LOSS: 0
OCCASIONAL FEELINGS OF UNSTEADINESS: 0
WEAKNESS: 0
PALPITATIONS: 0
FEVER: 0
PND: 0
SHORTNESS OF BREATH: 0
IRREGULAR HEARTBEAT: 0

## 2024-09-25 ASSESSMENT — PATIENT HEALTH QUESTIONNAIRE - PHQ9
2. FEELING DOWN, DEPRESSED OR HOPELESS: NOT AT ALL
1. LITTLE INTEREST OR PLEASURE IN DOING THINGS: NOT AT ALL
SUM OF ALL RESPONSES TO PHQ9 QUESTIONS 1 AND 2: 0

## 2024-09-25 ASSESSMENT — PAIN SCALES - GENERAL: PAINLEVEL: 0-NO PAIN

## 2024-09-25 NOTE — PROGRESS NOTES
Subjective      Chief Complaint   Patient presents with    Follow-up        73-year-old male with a history of paroxysmal atrial fibrillation on as needed rate control. He was not on oral anticoagulation given his [id number] of 1. He was seen in February, I had recommended again statin therapy due to an intermediate ASCVD risk he declined. He was seen in the interim by Dr Schwarz, referred to him by his PCP for palpitations. The had discussed AADs vs catheter based therapy. He had a CACS that came back >500. He had a lexiscan SPECT ST that was normal or low risk. We started Eliquis for stroke risk reduction.  When I saw him last he was still complaining of episodic palpitations, he seemed to be rather bothered by these.  We referred him to Dr. Schwarz for an evaluation, decision was made to continue with beta-blocker therapy initially.  Ultimately he did have an ablation in May 2024.  He was managed with amiodarone and digoxin initially for breakthrough A-fib, digoxin was stopped and amiodarone was reduced to 100mg every day.         Review of Systems   Constitutional: Negative for diaphoresis, fever, weight gain and weight loss.   Eyes:  Negative for visual disturbance.   Cardiovascular:  Negative for chest pain, claudication, dyspnea on exertion, irregular heartbeat, leg swelling, near-syncope, orthopnea, palpitations, paroxysmal nocturnal dyspnea and syncope.   Respiratory:  Negative for cough, shortness of breath and wheezing.    Musculoskeletal:  Negative for muscle weakness and myalgias.   Neurological:  Negative for dizziness and weakness.   All other systems reviewed and are negative.       Past Medical History:   Diagnosis Date    Atrial fibrillation (Multi)     Personal history of other diseases of the musculoskeletal system and connective tissue     History of low back pain        Past Surgical History:   Procedure Laterality Date    CARDIAC ELECTROPHYSIOLOGY PROCEDURE N/A 6/3/2024    Procedure:  Ablation A-Fib;  Surgeon: Jatinder Schwarz MD;  Location: [facility] Cardiac Cath Lab;  Service: Electrophysiology;  Laterality: N/A;  Afib cryoablation; CPT 79580, ICD I48.91 (no pre aoth needed)    TONSILLECTOMY  08/12/2014    Tonsillectomy        Social History     Socioeconomic History    Marital status: Unknown     Spouse name: Not on file    Number of children: Not on file    Years of education: Not on file    Highest education level: Not on file   Occupational History    Not on file   Tobacco Use    Smoking status: Never    Smokeless tobacco: Never   Substance and Sexual Activity    Alcohol use: Never    Drug use: Never    Sexual activity: Defer   Other Topics Concern    Not on file   Social History Narrative    ** Merged History Encounter **          Social Determinants of Health     Financial Resource Strain: Low Risk  (5/4/2024)    Overall Financial Resource Strain (CARDIA)     Difficulty of Paying Living Expenses: Not very hard   Food Insecurity: No Food Insecurity (11/30/2023)    Received from [facility]    Hunger Vital Sign     Worried About Running Out of Food in the Last Year: Never true     Ran Out of Food in the Last Year: Never true   Transportation Needs: No Transportation Needs (5/4/2024)    PRAPARE - Transportation     Lack of Transportation (Medical): No     Lack of Transportation (Non-Medical): No   Physical Activity: Insufficiently Active (11/30/2023)    Received from [facility]    Exercise Vital Sign     Days of Exercise per Week: 1 day     Minutes of Exercise per Session: 20 min   Stress: Stress Concern Present (11/30/2023)    Received from [facility]    [employer] [place] [employer] - Occupational Stress Questionnaire     Feeling of Stress : To some extent   Social Connections: Unknown (11/30/2023)    Received from [facility]    Social Connection and Isolation Panel [NHANES]     Frequency of  Communication with Friends and Family: Once a week     Frequency of Social Gatherings with Friends and Family: Patient declined     Attends [employer] Services: Never     Active Member of Clubs or Organizations: No     Attends Club or Organization Meetings: Never     Marital Status:    Intimate Partner Violence: Not At Risk (11/30/2023)    Received from [facility]    Humiliation, Afraid, Rape, and Kick questionnaire     Fear of Current or Ex-Partner: No     Emotionally Abused: No     Physically Abused: No     Sexually Abused: No   Housing Stability: Low Risk  (5/4/2024)    Housing Stability Vital Sign     Unable to Pay for Housing in the Last Year: No     Number of Places Lived in the Last Year: 1     Unstable Housing in the Last Year: No        Family History   Problem Relation Name Age of Onset    Heart disease Mother      Cancer Mother      Heart disease Father          OBJECTIVE:    Vitals:    09/25/24 1507   BP: 128/62   Pulse: 64   SpO2: 97%        Vitals reviewed.   Constitutional:       Appearance: Normal and healthy appearance. Not in distress.   Pulmonary:      Effort: Pulmonary effort is normal.      Breath sounds: Normal breath sounds.   Cardiovascular:      Normal rate. Regular rhythm. Normal S1. Normal S2.       Murmurs: There is no murmur.      No gallop.  No click.   Pulses:     Intact distal pulses.   Edema:     Peripheral edema absent.   Skin:     General: Skin is warm and dry.   Neurological:      General: No focal deficit present.          Lab Review:   Lab Results   Component Value Date    CHOL 123 (L) 09/22/2023    TRIG 113 09/22/2023    HDL 37 (L) 09/22/2023       Lab Results   Component Value Date    LDLCALC 63 (L) 09/22/2023        ASHD (arteriosclerotic heart disease)  Presumed, CACS was >500. Stable without angina. Continue statin. Checking lipids.     Atrial fibrillation (Multi)  S/p Ablation. NSR currently on low dose amiodarone. Sees EP next week. Continue  Eliquis

## 2024-10-08 ENCOUNTER — OFFICE VISIT (OUTPATIENT)
Dept: CARDIOLOGY | Facility: CLINIC | Age: 74
End: 2024-10-08
Payer: MEDICARE

## 2024-10-08 VITALS — SYSTOLIC BLOOD PRESSURE: 124 MMHG | BODY MASS INDEX: 28.45 KG/M2 | DIASTOLIC BLOOD PRESSURE: 68 MMHG | WEIGHT: 204 LBS

## 2024-10-08 DIAGNOSIS — I48.0 PAROXYSMAL ATRIAL FIBRILLATION (MULTI): Primary | ICD-10-CM

## 2024-10-08 PROCEDURE — 1126F AMNT PAIN NOTED NONE PRSNT: CPT | Performed by: INTERNAL MEDICINE

## 2024-10-08 PROCEDURE — 93010 ELECTROCARDIOGRAM REPORT: CPT | Performed by: INTERNAL MEDICINE

## 2024-10-08 PROCEDURE — 99213 OFFICE O/P EST LOW 20 MIN: CPT | Performed by: INTERNAL MEDICINE

## 2024-10-08 PROCEDURE — 1159F MED LIST DOCD IN RCRD: CPT | Performed by: INTERNAL MEDICINE

## 2024-10-08 PROCEDURE — 93005 ELECTROCARDIOGRAM TRACING: CPT | Performed by: INTERNAL MEDICINE

## 2024-10-08 PROCEDURE — 1036F TOBACCO NON-USER: CPT | Performed by: INTERNAL MEDICINE

## 2024-10-08 ASSESSMENT — ENCOUNTER SYMPTOMS
DEPRESSION: 1
LOSS OF SENSATION IN FEET: 0
OCCASIONAL FEELINGS OF UNSTEADINESS: 1

## 2024-10-08 ASSESSMENT — PATIENT HEALTH QUESTIONNAIRE - PHQ9
1. LITTLE INTEREST OR PLEASURE IN DOING THINGS: NOT AT ALL
SUM OF ALL RESPONSES TO PHQ9 QUESTIONS 1 AND 2: 0
2. FEELING DOWN, DEPRESSED OR HOPELESS: NOT AT ALL

## 2024-10-08 ASSESSMENT — COLUMBIA-SUICIDE SEVERITY RATING SCALE - C-SSRS
2. HAVE YOU ACTUALLY HAD ANY THOUGHTS OF KILLING YOURSELF?: NO
1. IN THE PAST MONTH, HAVE YOU WISHED YOU WERE DEAD OR WISHED YOU COULD GO TO SLEEP AND NOT WAKE UP?: NO
6. HAVE YOU EVER DONE ANYTHING, STARTED TO DO ANYTHING, OR PREPARED TO DO ANYTHING TO END YOUR LIFE?: NO

## 2024-10-08 ASSESSMENT — PAIN SCALES - GENERAL: PAINLEVEL: 0-NO PAIN

## 2024-10-08 NOTE — PROGRESS NOTES
No chief complaint on file.           The patient is well-known to me from previous encounters.  He is a 73-year-old male with a history of atrial fibrillation post catheter-based therapy in May of this year.  He is maintained on amiodarone and is seeing me today in follow-up.  He is doing quite well with no recurrent arrhythmias whatsoever.  He is compliant with his Eliquis and has no trouble with bleeding.  He remains fairly active.         Active Ambulatory Problems     Diagnosis Date Noted    Atrial fibrillation (Multi) 09/18/2023    Claudication (CMS-HCC) 09/18/2023    Electrocardiogram abnormal 09/18/2023    Hyperlipidemia 09/18/2023    Obstructive sleep apnea (adult) (pediatric) 09/18/2023    Palpitations 09/18/2023    ASHD (arteriosclerotic heart disease) 01/31/2024    Atrial flutter (Multi) 05/04/2024     Resolved Ambulatory Problems     Diagnosis Date Noted    Atrial fibrillation with rapid ventricular response (Multi) 05/04/2024     Past Medical History:   Diagnosis Date    Personal history of other diseases of the musculoskeletal system and connective tissue         Review of Systems   All other systems reviewed and are negative.       Objective     There were no vitals filed for this visit.     Vitals and nursing note reviewed.   Constitutional:       Appearance: Healthy appearance.   HENT:    Mouth/Throat:      Pharynx: Oropharynx is clear.   Pulmonary:      Effort: Pulmonary effort is normal.      Breath sounds: Normal breath sounds.   Cardiovascular:      PMI at left midclavicular line. Normal rate. Regular rhythm. Normal S1. Normal S2.       Murmurs: There is a grade 1/6 holosystolic murmur.      No gallop.  No click. No rub.   Pulses:     Intact distal pulses.   Edema:     Peripheral edema absent.   Abdominal:      General: Bowel sounds are normal.   Musculoskeletal:      Cervical back: Normal range of motion. Skin:     General: Skin is warm and dry.   Neurological:      General: No focal deficit  present.      Mental Status: Alert and oriented to person, place and time.            Lab Review:   No visits with results within 2 Month(s) from this visit.   Latest known visit with results is:   Admission on 06/03/2024, Discharged on 06/03/2024   Component Date Value    POCT Activated Clotting * 06/03/2024 281 (H)        ECG:  Sinus bradycardia 59 bpm left axis deviation MN interval 190 ms QRS duration 150 ms QTc 475 ms    Assessment/plan:  At this juncture I would recommend discontinuation of his amiodarone.  Continue Eliquis for now.  He will call us if he has any recurrence.    Problem List Items Addressed This Visit       Atrial fibrillation (Multi) - Primary    Relevant Orders    ECG 12 lead (Clinic Performed)

## 2024-10-08 NOTE — ASSESSMENT & PLAN NOTE
At this juncture I would recommend discontinuation of his amiodarone.  Continue Eliquis for now.  He will call us if he has any recurrence.

## 2024-10-31 ENCOUNTER — OFFICE VISIT (OUTPATIENT)
Dept: SLEEP MEDICINE | Facility: CLINIC | Age: 74
End: 2024-10-31
Payer: MEDICARE

## 2024-10-31 VITALS
SYSTOLIC BLOOD PRESSURE: 120 MMHG | OXYGEN SATURATION: 98 % | BODY MASS INDEX: 28.98 KG/M2 | WEIGHT: 207 LBS | HEART RATE: 65 BPM | HEIGHT: 71 IN | DIASTOLIC BLOOD PRESSURE: 70 MMHG

## 2024-10-31 DIAGNOSIS — G47.33 OBSTRUCTIVE SLEEP APNEA (ADULT) (PEDIATRIC): Primary | ICD-10-CM

## 2024-10-31 PROCEDURE — 3008F BODY MASS INDEX DOCD: CPT | Performed by: INTERNAL MEDICINE

## 2024-10-31 PROCEDURE — 1036F TOBACCO NON-USER: CPT | Performed by: INTERNAL MEDICINE

## 2024-10-31 PROCEDURE — 99214 OFFICE O/P EST MOD 30 MIN: CPT | Performed by: INTERNAL MEDICINE

## 2024-10-31 PROCEDURE — 1126F AMNT PAIN NOTED NONE PRSNT: CPT | Performed by: INTERNAL MEDICINE

## 2024-10-31 PROCEDURE — 1159F MED LIST DOCD IN RCRD: CPT | Performed by: INTERNAL MEDICINE

## 2024-10-31 PROCEDURE — 1160F RVW MEDS BY RX/DR IN RCRD: CPT | Performed by: INTERNAL MEDICINE

## 2024-10-31 ASSESSMENT — SLEEP AND FATIGUE QUESTIONNAIRES
HOW LIKELY ARE YOU TO NOD OFF OR FALL ASLEEP IN A CAR, WHILE STOPPED FOR A FEW MINUTES IN TRAFFIC: WOULD NEVER DOZE
HOW LIKELY ARE YOU TO NOD OFF OR FALL ASLEEP WHILE SITTING QUIETLY AFTER LUNCH WITHOUT ALCOHOL: WOULD NEVER DOZE
HOW LIKELY ARE YOU TO NOD OFF OR FALL ASLEEP WHILE SITTING AND READING: WOULD NEVER DOZE
HOW LIKELY ARE YOU TO NOD OFF OR FALL ASLEEP WHILE SITTING AND TALKING TO SOMEONE: WOULD NEVER DOZE
ESS-CHAD TOTAL SCORE: 4
SITING INACTIVE IN A PUBLIC PLACE LIKE A CLASS ROOM OR A MOVIE THEATER: WOULD NEVER DOZE
HOW LIKELY ARE YOU TO NOD OFF OR FALL ASLEEP WHEN YOU ARE A PASSENGER IN A CAR FOR AN HOUR WITHOUT A BREAK: WOULD NEVER DOZE
HOW LIKELY ARE YOU TO NOD OFF OR FALL ASLEEP WHILE WATCHING TV: MODERATE CHANCE OF DOZING
HOW LIKELY ARE YOU TO NOD OFF OR FALL ASLEEP WHILE LYING DOWN TO REST IN THE AFTERNOON WHEN CIRCUMSTANCES PERMIT: MODERATE CHANCE OF DOZING

## 2024-10-31 ASSESSMENT — PAIN SCALES - GENERAL: PAINLEVEL_OUTOF10: 0-NO PAIN

## 2024-12-03 ENCOUNTER — TELEPHONE (OUTPATIENT)
Dept: CARDIOLOGY | Facility: CLINIC | Age: 74
End: 2024-12-03
Payer: MEDICARE

## 2024-12-14 PROCEDURE — RXMED WILLOW AMBULATORY MEDICATION CHARGE

## 2024-12-17 ENCOUNTER — PHARMACY VISIT (OUTPATIENT)
Dept: PHARMACY | Facility: CLINIC | Age: 74
End: 2024-12-17
Payer: COMMERCIAL

## 2025-01-10 DIAGNOSIS — I48.0 PAROXYSMAL ATRIAL FIBRILLATION (MULTI): ICD-10-CM

## 2025-01-25 DIAGNOSIS — I48.91 ATRIAL FIBRILLATION, UNSPECIFIED TYPE (MULTI): ICD-10-CM

## 2025-01-26 RX ORDER — METOPROLOL TARTRATE 25 MG/1
25 TABLET, FILM COATED ORAL 3 TIMES DAILY
Qty: 270 TABLET | Refills: 3 | Status: SHIPPED | OUTPATIENT
Start: 2025-01-26

## 2025-01-28 ENCOUNTER — TELEPHONE (OUTPATIENT)
Dept: CARDIOLOGY | Facility: CLINIC | Age: 75
End: 2025-01-28

## 2025-01-28 ENCOUNTER — OFFICE VISIT (OUTPATIENT)
Dept: CARDIOLOGY | Facility: CLINIC | Age: 75
End: 2025-01-28
Payer: MEDICARE

## 2025-01-28 VITALS — HEART RATE: 71 BPM | SYSTOLIC BLOOD PRESSURE: 122 MMHG | DIASTOLIC BLOOD PRESSURE: 72 MMHG

## 2025-01-28 DIAGNOSIS — I48.0 PAROXYSMAL ATRIAL FIBRILLATION (MULTI): Primary | ICD-10-CM

## 2025-01-28 PROCEDURE — 99213 OFFICE O/P EST LOW 20 MIN: CPT | Performed by: INTERNAL MEDICINE

## 2025-01-28 PROCEDURE — G2211 COMPLEX E/M VISIT ADD ON: HCPCS | Performed by: INTERNAL MEDICINE

## 2025-01-28 PROCEDURE — 93010 ELECTROCARDIOGRAM REPORT: CPT | Performed by: INTERNAL MEDICINE

## 2025-01-28 PROCEDURE — 93005 ELECTROCARDIOGRAM TRACING: CPT | Performed by: INTERNAL MEDICINE

## 2025-01-28 PROCEDURE — 1159F MED LIST DOCD IN RCRD: CPT | Performed by: INTERNAL MEDICINE

## 2025-01-28 PROCEDURE — 99213 OFFICE O/P EST LOW 20 MIN: CPT | Mod: 25 | Performed by: INTERNAL MEDICINE

## 2025-01-28 ASSESSMENT — ENCOUNTER SYMPTOMS
NEUROLOGICAL NEGATIVE: 1
CARDIOVASCULAR NEGATIVE: 1
ENDOCRINE NEGATIVE: 1
RESPIRATORY NEGATIVE: 1
MYALGIAS: 1
CONSTITUTIONAL NEGATIVE: 1
EYES NEGATIVE: 1
GASTROINTESTINAL NEGATIVE: 1
PSYCHIATRIC NEGATIVE: 1
BACK PAIN: 1

## 2025-01-28 NOTE — PROGRESS NOTES
No chief complaint on file.           The patient is well-known to me.  He is a 74-year-old male with a history of atrial fibrillation post catheter-based therapy in May 2024.  He was transiently maintained on amiodarone but currently is only taking beta-blockers and anticoagulants.  He is seeing me for regular follow-up.  He is doing well without recurrent arrhythmias off amiodarone and off beta-blockers.  He suffers predominantly from arthritic and back pain but really has not had any recurrent sustained arrhythmias         Active Ambulatory Problems     Diagnosis Date Noted    Atrial fibrillation (Multi) 09/18/2023    Claudication (CMS-HCC) 09/18/2023    Electrocardiogram abnormal 09/18/2023    Hyperlipidemia 09/18/2023    Obstructive sleep apnea (adult) (pediatric) 09/18/2023    Palpitations 09/18/2023    ASHD (arteriosclerotic heart disease) 01/31/2024    Atrial flutter (Multi) 05/04/2024     Resolved Ambulatory Problems     Diagnosis Date Noted    Atrial fibrillation with rapid ventricular response (Multi) 05/04/2024     Past Medical History:   Diagnosis Date    Personal history of other diseases of the musculoskeletal system and connective tissue         Review of Systems   Constitutional: Negative.   HENT: Negative.     Eyes: Negative.    Cardiovascular: Negative.    Respiratory: Negative.     Endocrine: Negative.    Skin: Negative.    Musculoskeletal:  Positive for arthritis, back pain, joint pain and myalgias.   Gastrointestinal: Negative.    Genitourinary: Negative.    Neurological: Negative.    Psychiatric/Behavioral: Negative.          Objective     There were no vitals filed for this visit.     Vitals and nursing note reviewed.   Constitutional:       Appearance: Healthy appearance.   HENT:    Mouth/Throat:      Pharynx: Oropharynx is clear.   Pulmonary:      Effort: Pulmonary effort is normal.      Breath sounds: Normal breath sounds.   Cardiovascular:      PMI at left midclavicular line. Normal rate.  Regular rhythm. Normal S1. Normal S2.       Murmurs: There is a grade 1/6 holosystolic murmur.      No gallop.  No click. No rub.   Pulses:     Intact distal pulses.   Edema:     Peripheral edema absent.   Abdominal:      General: Bowel sounds are normal.   Musculoskeletal:      Cervical back: Normal range of motion. Skin:     General: Skin is warm and dry.   Neurological:      General: No focal deficit present.      Mental Status: Alert and oriented to person, place and time.            Lab Review:   No visits with results within 2 Month(s) from this visit.   Latest known visit with results is:   Admission on 06/03/2024, Discharged on 06/03/2024   Component Date Value    POCT Activated Clotting * 06/03/2024 281 (H)        ECG:  Normal sinus rhythm at 71 bpm  ms QRS duration 82 ms QTc 430 ms    Assessment/plan:  History as above.  Continue current regimen.  Follow-up in 1 year.  Problem List Items Addressed This Visit       Atrial fibrillation (Multi) - Primary    Relevant Orders    ECG 12 lead (Clinic Performed)

## 2025-01-28 NOTE — TELEPHONE ENCOUNTER
Patient stopped in the office to  lab slip for lipid panel.  The order was cancelled, do you still want him to have this?  If so, can you please put in another order?  Thanks    Notified patient, he expressed understanding.

## 2025-01-29 DIAGNOSIS — I25.10 ASHD (ARTERIOSCLEROTIC HEART DISEASE): Primary | ICD-10-CM

## 2025-01-31 LAB
CHOLEST SERPL-MCNC: 135 MG/DL
CHOLEST/HDLC SERPL: 2.7 (CALC)
HDLC SERPL-MCNC: 50 MG/DL
LDLC SERPL CALC-MCNC: 66 MG/DL (CALC)
NONHDLC SERPL-MCNC: 85 MG/DL (CALC)
TRIGL SERPL-MCNC: 111 MG/DL

## 2025-02-04 ENCOUNTER — APPOINTMENT (OUTPATIENT)
Dept: CARDIOLOGY | Facility: CLINIC | Age: 75
End: 2025-02-04
Payer: MEDICARE

## 2025-02-05 ENCOUNTER — OFFICE VISIT (OUTPATIENT)
Dept: CARDIOLOGY | Facility: CLINIC | Age: 75
End: 2025-02-05
Payer: MEDICARE

## 2025-02-05 VITALS
WEIGHT: 215 LBS | OXYGEN SATURATION: 97 % | BODY MASS INDEX: 29.99 KG/M2 | DIASTOLIC BLOOD PRESSURE: 72 MMHG | SYSTOLIC BLOOD PRESSURE: 136 MMHG | HEART RATE: 81 BPM

## 2025-02-05 DIAGNOSIS — E78.5 HYPERLIPIDEMIA, UNSPECIFIED HYPERLIPIDEMIA TYPE: ICD-10-CM

## 2025-02-05 DIAGNOSIS — I25.10 ASHD (ARTERIOSCLEROTIC HEART DISEASE): Primary | ICD-10-CM

## 2025-02-05 DIAGNOSIS — I48.0 PAROXYSMAL ATRIAL FIBRILLATION (MULTI): ICD-10-CM

## 2025-02-05 PROCEDURE — 99214 OFFICE O/P EST MOD 30 MIN: CPT | Performed by: INTERNAL MEDICINE

## 2025-02-05 PROCEDURE — 1125F AMNT PAIN NOTED PAIN PRSNT: CPT | Performed by: INTERNAL MEDICINE

## 2025-02-05 PROCEDURE — 1036F TOBACCO NON-USER: CPT | Performed by: INTERNAL MEDICINE

## 2025-02-05 ASSESSMENT — ENCOUNTER SYMPTOMS
DIZZINESS: 0
PND: 0
SHORTNESS OF BREATH: 0
MYALGIAS: 0
WEAKNESS: 0
DIAPHORESIS: 0
FEVER: 0
LOSS OF SENSATION IN FEET: 0
SYNCOPE: 0
WHEEZING: 0
PALPITATIONS: 0
WEIGHT GAIN: 0
WEIGHT LOSS: 0
OCCASIONAL FEELINGS OF UNSTEADINESS: 0
COUGH: 0
ORTHOPNEA: 0
CLAUDICATION: 0
DYSPNEA ON EXERTION: 0
DEPRESSION: 0
NEAR-SYNCOPE: 0
IRREGULAR HEARTBEAT: 0

## 2025-02-05 ASSESSMENT — PAIN SCALES - GENERAL: PAINLEVEL_OUTOF10: 2

## 2025-02-05 NOTE — PROGRESS NOTES
Subjective      No chief complaint on file.       74-year-old male whom I know very well.  He has a history of atrial fibrillation status post ablation in May 2024.  He was managed on amiodarone and digoxin initially for breakthrough atrial fibrillation, these were eventually discontinued and he remains on a beta-blocker.  He is on Eliquis for oral anticoagulation.  As for his coronary history he had a calcium score years ago that came back greater than 500.  We had him perform a Lexiscan SPECT stress test that came back normal or low risk.  He is here for follow-up. He is not all that active, denies limitations           Review of Systems   Constitutional: Negative for diaphoresis, fever, weight gain and weight loss.   Eyes:  Negative for visual disturbance.   Cardiovascular:  Negative for chest pain, claudication, dyspnea on exertion, irregular heartbeat, leg swelling, near-syncope, orthopnea, palpitations, paroxysmal nocturnal dyspnea and syncope.   Respiratory:  Negative for cough, shortness of breath and wheezing.    Musculoskeletal:  Negative for muscle weakness and myalgias.   Neurological:  Negative for dizziness and weakness.   All other systems reviewed and are negative.       Past Medical History:   Diagnosis Date    Atrial fibrillation (Multi)     Personal history of other diseases of the musculoskeletal system and connective tissue     History of low back pain        Past Surgical History:   Procedure Laterality Date    CARDIAC ELECTROPHYSIOLOGY PROCEDURE N/A 6/3/2024    Procedure: Ablation A-Fib;  Surgeon: Jatinder Schwarz MD;  Location: [facility] Cardiac [facility];  Service: Electrophysiology;  Laterality: N/A;  Afib cryoablation; CPT 94345, ICD I48.91 (no pre aoth needed)    TONSILLECTOMY  08/12/2014    Tonsillectomy        Social History     Socioeconomic History    Marital status:      Spouse name: Not on file    Number of children: Not on file    Years of education: Not on file    Highest  education level: Not on file   Occupational History    Not on file   Tobacco Use    Smoking status: Never    Smokeless tobacco: Never   Substance and Sexual Activity    Alcohol use: Never    Drug use: Never    Sexual activity: Defer   Other Topics Concern    Not on file   Social History Narrative    ** Merged History Encounter **          Social Drivers of Health     Financial Resource Strain: Low Risk  (5/4/2024)    Overall Financial Resource Strain (CARDIA)     Difficulty of Paying Living Expenses: Not very hard   Food Insecurity: No Food Insecurity (11/30/2023)    Received from [facility]    Hunger Vital Sign     Worried About Running Out of Food in the Last Year: Never true     Ran Out of Food in the Last Year: Never true   Transportation Needs: No Transportation Needs (5/4/2024)    PRAPARE - Transportation     Lack of Transportation (Medical): No     Lack of Transportation (Non-Medical): No   Physical Activity: Insufficiently Active (11/30/2023)    [facility]    Exercise Vital Sign     Days of Exercise per Week: 1 day     Minutes of Exercise per Session: 20 min   Stress: Stress Concern Present (11/30/2023)    Received from [facility] - Occupational Stress Questionnaire     Feeling of Stress : To some extent   Social Connections: Unknown (11/30/2023)    [facility]    Social Connection and Isolation Panel [NHANES]     Frequency of Communication with Friends and Family: Once a week     Frequency of Social Gatherings with Friends and Family: Patient declined     Attends Worship Services: Never     Active Member of Clubs or Organizations: No     Attends Club or Organization Meetings: Never     Marital Status:    Intimate Partner Violence: Not At Risk (11/30/2023)    Received from [facility]    Humiliation, Afraid, Rape, and Kick  questionnaire     Fear of Current or Ex-Partner: No     Emotionally Abused: No     Physically Abused: No     Sexually Abused: No   Housing Stability: Low Risk  (5/4/2024)    Housing Stability Vital Sign     Unable to Pay for Housing in the Last Year: No     Number of Places Lived in the Last Year: 1     Unstable Housing in the Last Year: No        Family History   Problem Relation Name Age of Onset    Heart disease Mother      Cancer Mother      Heart disease Father          OBJECTIVE:    Vitals:    02/05/25 0855   BP: 136/72   Pulse: 81   SpO2: 97%        Vitals reviewed.   Constitutional:       Appearance: Normal and healthy appearance. Not in distress.   Pulmonary:      Effort: Pulmonary effort is normal.      Breath sounds: Normal breath sounds.   Cardiovascular:      Normal rate. Regular rhythm. Normal S1. Normal S2.       Murmurs: There is no murmur.      No gallop.  No click.   Pulses:     Intact distal pulses.   Edema:     Peripheral edema absent.   Skin:     General: Skin is warm and dry.   Neurological:      General: No focal deficit present.          Lab Review:   Lab Results   Component Value Date     (L) 05/30/2024    K 4.5 05/30/2024     05/30/2024    CO2 22 05/30/2024    BUN 25 (H) 05/30/2024    CREATININE 1.18 05/30/2024    GLUCOSE 125 (H) 05/30/2024    CALCIUM 9.8 05/30/2024     Lab Results   Component Value Date    CHOL 135 01/31/2025    TRIG 111 01/31/2025    HDL 50 01/31/2025       Lab Results   Component Value Date    LDLCALC 66 01/31/2025        ASHD (arteriosclerotic heart disease)  Stable without angina. Continue statin, asa. Lipids from last month reviewed with him, LDL is below goal    Hyperlipidemia  Lipids reviewed with him and are excellent. Lifestyle modifications were discussed. I advocated for mediterranean and plant based eating. We also discussed exercise. 150 minutes a week of moderate intensity exercise is recommended per our ACC/AHA guidelines      Atrial fibrillation  (Multi)  Nsr currently. Stable without signs of heart failure. Continue rate control and AC for stroke risk reduction.

## 2025-02-05 NOTE — ASSESSMENT & PLAN NOTE
Lipids reviewed with him and are excellent. Lifestyle modifications were discussed. I advocated for mediterranean and plant based eating. We also discussed exercise. 150 minutes a week of moderate intensity exercise is recommended per our ACC/AHA guidelines

## 2025-02-05 NOTE — ASSESSMENT & PLAN NOTE
Stable without angina. Continue statin, asa. Lipids from last month reviewed with him, LDL is below goal

## 2025-02-05 NOTE — ASSESSMENT & PLAN NOTE
Nsr currently. Stable without signs of heart failure. Continue rate control and AC for stroke risk reduction.

## 2025-07-31 ENCOUNTER — TELEPHONE (OUTPATIENT)
Dept: CARDIOLOGY | Facility: CLINIC | Age: 75
End: 2025-07-31
Payer: MEDICARE

## 2025-07-31 NOTE — TELEPHONE ENCOUNTER
Cancelled patients 8/6 appointment- provider on hospital service. Left a message with patients spouse for patient to call us and reschedule.

## 2025-08-06 ENCOUNTER — APPOINTMENT (OUTPATIENT)
Dept: CARDIOLOGY | Facility: CLINIC | Age: 75
End: 2025-08-06
Payer: MEDICARE

## 2025-08-31 DIAGNOSIS — E78.2 MIXED HYPERLIPIDEMIA: ICD-10-CM

## 2025-09-02 RX ORDER — ATORVASTATIN CALCIUM 20 MG/1
20 TABLET, FILM COATED ORAL DAILY
Qty: 90 TABLET | Refills: 3 | Status: SHIPPED | OUTPATIENT
Start: 2025-09-02

## 2025-09-22 ENCOUNTER — APPOINTMENT (OUTPATIENT)
Age: 75
End: 2025-09-22
Payer: MEDICARE

## (undated) DEVICE — PAD, ELECTRODE DEFIB PADPRO ADULT STRL W/ADAPTER

## (undated) DEVICE — GUIDEWIRE, AMPLATZ, TFE, EXTRA STIFF, CURVED, .032/145CM/3MMTIP

## (undated) DEVICE — CLOSURE SYSTEM, VASCULAR, MVP 6-12FR, VENOUS

## (undated) DEVICE — CATHETER, VIEW FLEX XTRA ICE, 9FR (REPROCESSED)

## (undated) DEVICE — CABLE, COAXIAL, UMBILICAL

## (undated) DEVICE — INTRODUCER SET, ULTIMUM IV, 12FR X 12CM

## (undated) DEVICE — PACK, ANGIO P2, CUSTOM, LAKE

## (undated) DEVICE — CABLE, OCTAPOLAR, EASYMATE 8 125CML

## (undated) DEVICE — KIT, NAMIC STANDARD LEFT HEART, CUSTOM, LWMC

## (undated) DEVICE — CATHETER, ARCTIC FRONT ADVANCE PRO, 28MM

## (undated) DEVICE — CABLE, ACHIEVE ADVANCE, 10 PIN, 196CM, GRAY

## (undated) DEVICE — INTRODUCER, PERCUTANEOUS, SHEATH AVANTI PLUS, W/MINI GUIDEWIRE, STANDARD LENGTH, 8 FR, 11 CM

## (undated) DEVICE — INTRODUCER, SHEATH, FAST-CATH, 7 FR X 23 CM

## (undated) DEVICE — INTRODUCER, SHEATH, AVANTI, 10 FR X 11 CM

## (undated) DEVICE — CATHETER, ELECTRODE, STEERABLE, EP-XT, LARGE CURVE, 6 FR X 110 CM

## (undated) DEVICE — ELECTRODE KIT, ENSITE X EP SYSTEM SURFACE

## (undated) DEVICE — COVER, EQUIPMENT, ZERO GRAVITY

## (undated) DEVICE — INTRODUCER, TRANSSEPTAL GUIDE, SWARTZ, SLO 8FR/63CM

## (undated) DEVICE — CATHETER, ACHIEVE MAPPING, 20MM

## (undated) DEVICE — NEEDLE, TRANSSEPTAL, BROCKENBROUGH CURVE, ADULT, 18 G X 71 CM

## (undated) DEVICE — CABLE, ELECTRICAL, UMBILICAL

## (undated) DEVICE — STEERABLE SHEATH, FLEXCATH, 12FR

## (undated) DEVICE — CATHETER, INQUIRY, 6FR X 110CM, 2-5-2MM SPACING, 1MM TIP, LG CURVE STEERABLE